# Patient Record
Sex: FEMALE | Race: WHITE | NOT HISPANIC OR LATINO | Employment: OTHER | URBAN - NONMETROPOLITAN AREA
[De-identification: names, ages, dates, MRNs, and addresses within clinical notes are randomized per-mention and may not be internally consistent; named-entity substitution may affect disease eponyms.]

---

## 2021-07-21 ENCOUNTER — OFFICE VISIT (OUTPATIENT)
Dept: FAMILY MEDICINE CLINIC | Facility: CLINIC | Age: 41
End: 2021-07-21

## 2021-07-21 VITALS
HEART RATE: 90 BPM | TEMPERATURE: 97.7 F | SYSTOLIC BLOOD PRESSURE: 145 MMHG | BODY MASS INDEX: 36.19 KG/M2 | WEIGHT: 212 LBS | DIASTOLIC BLOOD PRESSURE: 93 MMHG | OXYGEN SATURATION: 98 % | HEIGHT: 64 IN

## 2021-07-21 DIAGNOSIS — F19.11 SUBSTANCE ABUSE IN REMISSION (HCC): ICD-10-CM

## 2021-07-21 DIAGNOSIS — R03.0 ELEVATED BLOOD PRESSURE READING: ICD-10-CM

## 2021-07-21 DIAGNOSIS — R00.0 TACHYCARDIA: ICD-10-CM

## 2021-07-21 DIAGNOSIS — S90.822D BLISTER OF LEFT FOOT, SUBSEQUENT ENCOUNTER: Primary | ICD-10-CM

## 2021-07-21 DIAGNOSIS — E11.65 TYPE 2 DIABETES MELLITUS WITH HYPERGLYCEMIA, WITHOUT LONG-TERM CURRENT USE OF INSULIN (HCC): ICD-10-CM

## 2021-07-21 DIAGNOSIS — F17.200 TOBACCO USE DISORDER: ICD-10-CM

## 2021-07-21 PROCEDURE — 99203 OFFICE O/P NEW LOW 30 MIN: CPT | Performed by: FAMILY MEDICINE

## 2021-07-21 RX ORDER — DAPAGLIFLOZIN AND METFORMIN HYDROCHLORIDE 2.5; 1 MG/1; MG/1
1 TABLET, FILM COATED, EXTENDED RELEASE ORAL DAILY
Qty: 14 TABLET | Refills: 0 | COMMUNITY
Start: 2021-07-21 | End: 2021-12-14 | Stop reason: SDUPTHER

## 2021-07-21 RX ORDER — BUPRENORPHINE HYDROCHLORIDE AND NALOXONE HYDROCHLORIDE DIHYDRATE 8; 2 MG/1; MG/1
TABLET SUBLINGUAL
COMMUNITY
Start: 2021-07-11

## 2021-07-21 RX ORDER — BLOOD-GLUCOSE METER
1 EACH MISCELLANEOUS DAILY
Qty: 1 KIT | Refills: 0 | COMMUNITY
Start: 2021-07-21

## 2021-07-21 NOTE — PATIENT INSTRUCTIONS
Start xigduo xr 2.5-1000 once a day  Monitor blood glucose  Get tetanus shot from health dept  Low carb diet

## 2021-07-21 NOTE — PROGRESS NOTES
Subjective   Greer Moses is a 41 y.o. female.     Chief Complaint   Patient presents with   • Diabetes   • Establish Care       History of Present Illness   new patient to this office  recent diagnosis of DM  When she was seen in the ER after left foot injury, needs Tdap, will go to health dept  A1c was elevated at 7,0, willstart sample xigduo xr  2.5/100  She goes to [facility] in [place] for [facility] abuse, is on Suboxone , started 6 years ago in [employer] [employer], , with [facility] for 2 years  Family hx + DM,   Drives for [employer]  Has gained weight recenlty and BP is elevated  Hx tachycardia      The following portions of the patient's history were reviewed and updated as appropriate: allergies, current medications, past family history, past medical history, past social history, past surgical history and problem list.    Past Medical History:   Diagnosis Date   • Diabetes mellitus (CMS/HCC)    • Substance abuse (CMS/HCC)        Past Surgical History:   Procedure Laterality Date   • HYSTERECTOMY         Family History   Problem Relation Age of Onset   • Diabetes Father    • Diabetes Paternal Aunt    • Diabetes Paternal Uncle    • Cancer Paternal Grandmother        Review of Systems   Constitutional: Negative for fatigue, unexpected weight gain and unexpected weight loss.   HENT: Negative for congestion, sinus pressure and sore throat.         Normal smell/taste   Eyes: Negative for blurred vision and visual disturbance.   Respiratory: Negative for cough, shortness of breath and wheezing.    Cardiovascular: Negative for chest pain, palpitations and leg swelling.   Gastrointestinal: Negative for abdominal pain, constipation, diarrhea, nausea, vomiting, GERD and indigestion.   Musculoskeletal: Negative for arthralgias, back pain, gait problem, joint swelling and neck pain.   Skin: Negative for rash, skin lesions and bruise.   Allergic/Immunologic: Negative for environmental allergies.   Neurological: Negative for  dizziness, tremors, syncope, weakness, light-headedness, headache and memory problem.   Psychiatric/Behavioral: Negative for sleep disturbance, depressed mood and stress. The patient is not nervous/anxious.            Objective   Physical Exam  Vitals and nursing note reviewed.   Constitutional:       General: She is not in acute distress.     Appearance: She is well-developed. She is obese. She is not diaphoretic.   HENT:      Head: Normocephalic and atraumatic.      Right Ear: External ear normal.      Left Ear: External ear normal.      Nose: Nose normal.   Eyes:      Conjunctiva/sclera: Conjunctivae normal.      Pupils: Pupils are equal, round, and reactive to light.   Neck:      Thyroid: No thyromegaly.   Cardiovascular:      Rate and Rhythm: Normal rate and regular rhythm.      Heart sounds: Normal heart sounds. No murmur heard.   No friction rub. No gallop.    Pulmonary:      Effort: Pulmonary effort is normal.      Breath sounds: Normal breath sounds. No wheezing.   Abdominal:      General: Bowel sounds are normal.      Palpations: Abdomen is soft.      Tenderness: There is no abdominal tenderness.   Musculoskeletal:         General: No tenderness or deformity. Normal range of motion.      Cervical back: Normal range of motion and neck supple.   Lymphadenopathy:      Cervical: No cervical adenopathy.   Skin:     General: Skin is warm and dry.      Capillary Refill: Capillary refill takes less than 2 seconds.      Findings: No rash.   Neurological:      Mental Status: She is alert and oriented to person, place, and time.      Cranial Nerves: No cranial nerve deficit.   Psychiatric:         Behavior: Behavior normal.         Thought Content: Thought content normal.         Judgment: Judgment normal.           Vitals:    07/21/21 0924   BP: 145/93   Pulse:    Temp:    SpO2:      Body mass index is 36.97 kg/m².    No results found for: HGBA1C, POCGLU, LDL, CBC, CMP, TSH  No results found for this or any previous  visit.    Assessment/Plan   Diagnoses and all orders for this visit:    1. Blister of left foot, subsequent encounter (Primary)  -     Cancel: Tdap Vaccine Greater Than or Equal To 6yo IM    2. Type 2 diabetes mellitus with hyperglycemia, without long-term current use of insulin (CMS/[facility])    3. Elevated blood pressure reading    4. Tachycardia    5. Substance abuse in remission (CMS/[facility])    6. Tobacco use disorder    Other orders  -     Dapagliflozin-metFORMIN HCl ER (Xigduo XR) 2.5-1000 MG tablet sustained-release 24 hour; Take 1 tablet by mouth Daily.  Dispense: 14 tablet; Refill: 0  -     Blood Glucose Monitoring Suppl (Accu-Chek Guide Me) w/Device kit; 1 Device Daily.  Dispense: 1 kit; Refill: 0        Monitor blood glucose  Get labs from ER  monitorblood glucose once daily

## 2021-12-14 ENCOUNTER — TELEMEDICINE (OUTPATIENT)
Dept: FAMILY MEDICINE CLINIC | Facility: TELEHEALTH | Age: 41
End: 2021-12-14

## 2021-12-14 DIAGNOSIS — R09.81 SINUS CONGESTION: ICD-10-CM

## 2021-12-14 DIAGNOSIS — E11.9 TYPE 2 DIABETES MELLITUS WITHOUT COMPLICATION, UNSPECIFIED WHETHER LONG TERM INSULIN USE (HCC): ICD-10-CM

## 2021-12-14 DIAGNOSIS — R11.2 NAUSEA AND VOMITING, INTRACTABILITY OF VOMITING NOT SPECIFIED, UNSPECIFIED VOMITING TYPE: ICD-10-CM

## 2021-12-14 DIAGNOSIS — R05.9 COUGH: ICD-10-CM

## 2021-12-14 DIAGNOSIS — J06.9 UPPER RESPIRATORY TRACT INFECTION, UNSPECIFIED TYPE: Primary | ICD-10-CM

## 2021-12-14 DIAGNOSIS — Z20.822 ENCOUNTER BY TELEHEALTH FOR SUSPECTED COVID-19: ICD-10-CM

## 2021-12-14 DIAGNOSIS — B37.0 THRUSH, ORAL: ICD-10-CM

## 2021-12-14 PROCEDURE — 99203 OFFICE O/P NEW LOW 30 MIN: CPT | Performed by: NURSE PRACTITIONER

## 2021-12-14 RX ORDER — ONDANSETRON 4 MG/1
4 TABLET, ORALLY DISINTEGRATING ORAL EVERY 8 HOURS PRN
Qty: 9 TABLET | Refills: 0 | Status: SHIPPED | OUTPATIENT
Start: 2021-12-14 | End: 2021-12-29

## 2021-12-14 RX ORDER — DAPAGLIFLOZIN AND METFORMIN HYDROCHLORIDE 2.5; 1 MG/1; MG/1
1 TABLET, FILM COATED, EXTENDED RELEASE ORAL DAILY
Qty: 14 TABLET | Refills: 0 | COMMUNITY
Start: 2021-12-14 | End: 2021-12-29 | Stop reason: SINTOL

## 2021-12-14 NOTE — PATIENT INSTRUCTIONS
Cough, Adult  A cough helps to clear your throat and lungs. A cough may be a sign of an illness or another medical condition.  An acute cough may only last 2-3 weeks, while a chronic cough may last 8 or more weeks.  Many things can cause a cough. They include:  · Germs (viruses or bacteria) that attack the airway.  · Breathing in things that bother (irritate) your lungs.  · Allergies.  · Asthma.  · Mucus that runs down the back of your throat (postnasal drip).  · Smoking.  · Acid backing up from the stomach into the tube that moves food from the mouth to the stomach (gastroesophageal reflux).  · Some medicines.  · Lung problems.  · Other medical conditions, such as heart failure or a blood clot in the lung (pulmonary embolism).  Follow these instructions at home:  Medicines  · Take over-the-counter and prescription medicines only as told by your doctor.  · Talk with your doctor before you take medicines that stop a cough (cough suppressants).  Lifestyle    · Do not smoke, and try not to be around smoke. Do not use any products that contain nicotine or tobacco, such as cigarettes, e-cigarettes, and chewing tobacco. If you need help quitting, ask your doctor.  · Drink enough fluid to keep your pee (urine) pale yellow.  · Avoid caffeine.  · Do not drink alcohol if your doctor tells you not to drink.    General instructions    · Watch for any changes in your cough. Tell your doctor about them.  · Always cover your mouth when you cough.  · Stay away from things that make you cough, such as perfume, candles, campfire smoke, or cleaning products.  · If the air is dry, use a cool mist vaporizer or humidifier in your home.  · If your cough is worse at night, try using extra pillows to raise your head up higher while you sleep.  · Rest as needed.  · Keep all follow-up visits as told by your doctor. This is important.    Contact a doctor if:  · You have new symptoms.  · You cough up pus.  · Your cough does not get better after  2-3 weeks, or your cough gets worse.  · Cough medicine does not help your cough and you are not sleeping well.  · You have pain that gets worse or pain that is not helped with medicine.  · You have a fever.  · You are losing weight and you do not know why.  · You have night sweats.  Get help right away if:  · You cough up blood.  · You have trouble breathing.  · Your heartbeat is very fast.  These symptoms may be an emergency. Do not wait to see if the symptoms will go away. Get medical help right away. Call your local emergency services (911 in the [place] Do not drive yourself to the hospital.  Summary  · A cough helps to clear your throat and lungs. Many things can cause a cough.  · Take over-the-counter and prescription medicines only as told by your doctor.  · Always cover your mouth when you cough.  · Contact a doctor if you have new symptoms or you have a cough that does not get better or gets worse.  This information is not intended to replace advice given to you by your health care provider. Make sure you discuss any questions you have with your health care provider.  Document Revised: 02/05/2021 Document Reviewed: 01/06/2020  [employer] Patient Education © 2021 [employer]      General Headache Without Cause  A headache is pain or discomfort that is felt around the head or neck area. There are many causes and types of headaches. In some cases, the cause may not be found.  Follow these instructions at home:  Watch your condition for any changes. Let your doctor know about them. Take these steps to help with your condition:  Managing pain         · Take over-the-counter and prescription medicines only as told by your doctor.  · Lie down in a dark, quiet room when you have a headache.  · If told, put ice on your head and neck area:  ? Put ice in a plastic bag.  ? Place a towel between your skin and the bag.  ? Leave the ice on for 20 minutes, 2-3 times per day.  · If told, put heat on the affected area. Use  the heat source that your doctor recommends, such as a moist heat pack or a heating pad.  ? Place a towel between your skin and the heat source.  ? Leave the heat on for 20-30 minutes.  ? Remove the heat if your skin turns bright red. This is very important if you are unable to feel pain, heat, or cold. You may have a greater risk of getting burned.  · Keep lights dim if bright lights bother you or make your headaches worse.  Eating and drinking  · Eat meals on a regular schedule.  · If you drink alcohol:  ? Limit how much you use to:  § 0-1 drink a day for women.  § 0-2 drinks a day for men.  ? Be aware of how much alcohol is in your drink. In the [place] one drink equals one 12 oz bottle of beer (355 mL), one 5 oz glass of wine (148 mL), or one 1½ oz glass of hard liquor (44 mL).  · Stop drinking caffeine, or reduce how much caffeine you drink.  General instructions    · Keep a journal to find out if certain things bring on headaches. For example, write down:  ? What you eat and drink.  ? How much sleep you get.  ? Any change to your diet or medicines.  · Get a massage or try other ways to relax.  · Limit stress.  · Sit up straight. Do not tighten (tense) your muscles.  · Do not use any products that contain nicotine or tobacco. This includes cigarettes, e-cigarettes, and chewing tobacco. If you need help quitting, ask your doctor.  · Exercise regularly as told by your doctor.  · Get enough sleep. This often means 7-9 hours of sleep each night.  · Keep all follow-up visits as told by your doctor. This is important.    Contact a doctor if:  · Your symptoms are not helped by medicine.  · You have a headache that feels different than the other headaches.  · You feel sick to your stomach (nauseous) or you throw up (vomit).  · You have a fever.  Get help right away if:  · Your headache gets very bad quickly.  · Your headache gets worse after a lot of physical activity.  · You keep throwing up.  · You have a stiff  neck.  · You have trouble seeing.  · You have trouble speaking.  · You have pain in the eye or ear.  · Your muscles are weak or you lose muscle control.  · You lose your balance or have trouble walking.  · You feel like you will pass out (faint) or you pass out.  · You are mixed up (confused).  · You have a seizure.  Summary  · A headache is pain or discomfort that is felt around the head or neck area.  · There are many causes and types of headaches. In some cases, the cause may not be found.  · Keep a journal to help find out what causes your headaches. Watch your condition for any changes. Let your doctor know about them.  · Contact a doctor if you have a headache that is different from usual, or if your headache is not helped by medicine.  · Get help right away if your headache gets very bad, you throw up, you have trouble seeing, you lose your balance, or you have a seizure.  This information is not intended to replace advice given to you by your health care provider. Make sure you discuss any questions you have with your health care provider.  Document Revised: 07/08/2019 Document Reviewed: 07/08/2019  [employer] Patient Education © 2021 [employer]    10 Things You Can Do to Manage Your COVID-19 Symptoms at Home  If you have possible or confirmed COVID-19:  1. Stay home except to get medical care.  2. Monitor your symptoms carefully. If your symptoms get worse, call your healthcare provider immediately.  3. Get rest and stay hydrated.  4. If you have a medical appointment, call the healthcare provider ahead of time and tell them that you have or may have COVID-19.  5. For medical emergencies, call 911 and notify the dispatch personnel that you have or may have COVID-19.  6. Cover your cough and sneezes with a tissue or use the inside of your elbow.  7. Wash your hands often with soap and water for at least 20 seconds or clean your hands with an alcohol-based hand  that contains at least 60%  alcohol.  8. As much as possible, stay in a specific room and away from other people in your home. Also, you should use a separate bathroom, if available. If you need to be around other people in or outside of the home, wear a mask.  9. Avoid sharing personal items with other people in your household, like dishes, towels, and bedding.  10. Clean all surfaces that are touched often, like counters, tabletops, and doorknobs. Use household cleaning sprays or wipes according to the label instructions.  cdc.gov/coronavirus  07/16/2021  This information is not intended to replace advice given to you by your health care provider. Make sure you discuss any questions you have with your health care provider.  Document Revised: 08/04/2021 Document Reviewed: 08/04/2021  [employer] Patient Education © 2021 [employer]      Diarrhea, Adult  Diarrhea is when you pass loose and watery poop (stool) often. Diarrhea can make you feel weak and cause you to lose water in your body (get dehydrated). Losing water in your body can cause you to:  · Feel tired and thirsty.  · Have a dry mouth.  · Go pee (urinate) less often.  Diarrhea often lasts 2-3 days. However, it can last longer if it is a sign of something more serious. It is important to treat your diarrhea as told by your doctor.  Follow these instructions at home:  Eating and drinking         Follow these instructions as told by your doctor:  · Take an ORS (oral rehydration solution). This is a drink that helps you replace fluids and minerals your body lost. It is sold at pharmacies and stores.  · Drink plenty of fluids, such as:  ? Water.  ? Ice chips.  ? Diluted fruit juice.  ? Low-calorie sports drinks.  ? Milk, if you want.  · Avoid drinking fluids that have a lot of sugar or caffeine in them.  · Eat bland, easy-to-digest foods in small amounts as you are able. These foods include:  ? Bananas.  ? Applesauce.  ? Rice.  ? Low-fat (lean) meats.  ? Toast.  ? Crackers.  · Avoid  alcohol.  · Avoid spicy or fatty foods.    Medicines  · Take over-the-counter and prescription medicines only as told by your doctor.  · If you were prescribed an antibiotic medicine, take it as told by your doctor. Do not stop using the antibiotic even if you start to feel better.  General instructions    · Wash your hands often using soap and water. If soap and water are not available, use a hand . Others in your home should wash their hands as well. Hands should be washed:  ? After using the toilet or changing a diaper.  ? Before preparing, cooking, or serving food.  ? While caring for a sick person.  ? While visiting someone in a hospital.  · Drink enough fluid to keep your pee (urine) pale yellow.  · Rest at home while you get better.  · Watch your condition for any changes.  · Take a warm bath to help with any burning or pain from having diarrhea.  · Keep all follow-up visits as told by your doctor. This is important.    Contact a doctor if:  · You have a fever.  · Your diarrhea gets worse.  · You have new symptoms.  · You cannot keep fluids down.  · You feel light-headed or dizzy.  · You have a headache.  · You have muscle cramps.  Get help right away if:  · You have chest pain.  · You feel very weak or you pass out (faint).  · You have bloody or black poop or poop that looks like tar.  · You have very bad pain, cramping, or bloating in your belly (abdomen).  · You have trouble breathing or you are breathing very quickly.  · Your heart is beating very quickly.  · Your skin feels cold and clammy.  · You feel confused.  · You have signs of losing too much water in your body, such as:  ? Dark pee, very little pee, or no pee.  ? Cracked lips.  ? Dry mouth.  ? Sunken eyes.  ? Sleepiness.  ? Weakness.  Summary  · Diarrhea is when you pass loose and watery poop (stool) often.  · Diarrhea can make you feel weak and cause you to lose water in your body (get dehydrated).  · Take an ORS (oral rehydration  solution). This is a drink that is sold at pharmacies and stores.  · Eat bland, easy-to-digest foods in small amounts as you are able.  · Contact a doctor if your condition gets worse. Get help right away if you have signs that you have lost too much water in your body.  This information is not intended to replace advice given to you by your health care provider. Make sure you discuss any questions you have with your health care provider.  Document Revised: 05/24/2019 Document Reviewed: 05/24/2019  [employer] Patient Education © 2021 [employer]    Vomiting, Adult  Vomiting occurs when stomach contents are thrown up and out of the mouth. Many people notice nausea before vomiting. Vomiting can make you feel weak and cause you to become dehydrated. Dehydration can make you feel tired and thirsty, cause you to have a dry mouth, and decrease how often you urinate. Older adults and people who have other diseases or a weak body defense system (immune system) are at higher risk for dehydration. It is important to treat vomiting as told by your health care provider.  Follow these instructions at home:    Eating and drinking         Follow these recommendations as told by your health care provider:  · Take an oral rehydration solution (ORS). This is a drink that is sold at pharmacies and retail stores.  · Eat bland, easy-to-digest foods in small amounts as you are able. These foods include bananas, applesauce, rice, lean meats, toast, and crackers.  · Drink clear fluids slowly and in small amounts as you are able. Clear fluids include water, ice chips, low-calorie sports drinks, and fruit juice that has water added (diluted fruit juice).  · Avoid drinking fluids that contain a lot of sugar or caffeine, such as energy drinks, sports drinks, and soda.  · Avoid alcohol.  · Avoid spicy or fatty foods.    General instructions  · Wash your hands often using soap and water. If soap and water are not available, use hand .  Make sure that everyone in your household washes their hands frequently.  · Take over-the-counter and prescription medicines only as told by your health care provider.  · Rest at home while you recover.  · Watch your condition for any changes.  · Keep all follow-up visits as told by your health care provider. This is important.  Contact a health care provider if:  · Your vomiting gets worse.  · You have new symptoms.  · You have a fever.  · You cannot drink fluids without vomiting.  · You feel light-headed or dizzy.  · You have a headache.  · You have muscle cramps.  · You have a rash.  · You have pain while urinating.  Get help right away if:  · You have pain in your chest, neck, arm, or jaw.  · You feel extremely weak or you faint.  · You have persistent vomiting.  · You have vomit that is bright red or looks like black coffee grounds.  · You have stools that are bloody or black, or stools that look like tar.  · You have a severe headache, a stiff neck, or both.  · You have severe pain, cramping, or bloating in your abdomen.  · You have trouble breathing or you are breathing very quickly.  · Your heart is beating very quickly.  · Your skin feels cold and clammy.  · You feel confused.  · You have signs of dehydration, such as:  ? Dark urine, very little urine, or no urine.  ? Cracked lips.  ? Dry mouth.  ? Sunken eyes.  ? Sleepiness.  ? Weakness.  These symptoms may represent a serious problem that is an emergency. Do not wait to see if the symptoms will go away. Get medical help right away. Call your local emergency services (911 in the [place] Do not drive yourself to the hospital.  Summary  · Vomiting occurs when stomach contents are thrown up and out of the mouth. Vomiting can cause you to become dehydrated. Older adults and people who have other diseases or a weak immune system are at higher risk for dehydration.  · It is important to treat vomiting as told by your health care provider. Follow your health care  provider's instructions about eating and drinking.  · Wash your hands often using soap and water. If soap and water are not available, use hand . Make sure that everyone in your household washes their hands frequently.  · Watch your condition for any changes and for signs of dehydration.  · Keep all follow-up visits as told by your health care provider. This is important.  This information is not intended to replace advice given to you by your health care provider. Make sure you discuss any questions you have with your health care provider.  Document Revised: 06/05/2020 Document Reviewed: 05/28/2019  [employer] Patient Education © 2021 [employer]    Nausea, Adult  Nausea is feeling sick to your stomach or feeling that you are about to throw up (vomit). Feeling sick to your stomach is usually not serious, but it may be an early sign of a more serious medical problem.  As you feel sicker to your stomach, you may throw up. If you throw up, or if you are not able to drink enough fluids, there is a risk that you may lose too much water in your body (get dehydrated). If you lose too much water in your body, you may:  · Feel tired.  · Feel thirsty.  · Have a dry mouth.  · Have cracked lips.  · Go pee (urinate) less often.  Older adults and people who have other diseases or a weak body defense system (immune system) have a higher risk of losing too much water in the body.  The main goals of treating this condition are:  · To relieve your nausea.  · To ensure your nausea occurs less often.  · To prevent throwing up and losing too much fluid.  Follow these instructions at home:  Watch your symptoms for any changes. Tell your doctor about them. Follow these instructions as told by your doctor.  Eating and drinking         · Take an ORS (oral rehydration solution). This is a drink that is sold at pharmacies and stores.  · Drink clear fluids in small amounts as you are able. These include:  ? Water.  ? Ice  chips.  ? Fruit juice that has water added (diluted fruit juice).  ? Low-calorie sports drinks.  · Eat bland, easy-to-digest foods in small amounts as you are able, such as:  ? Bananas.  ? Applesauce.  ? Rice.  ? Low-fat (lean) meats.  ? Toast.  ? Crackers.  · Avoid drinking fluids that have a lot of sugar or caffeine in them. This includes energy drinks, sports drinks, and soda.  · Avoid alcohol.  · Avoid spicy or fatty foods.  General instructions  · Take over-the-counter and prescription medicines only as told by your doctor.  · Rest at home while you get better.  · Drink enough fluid to keep your pee (urine) pale yellow.  · Take slow and deep breaths when you feel sick to your stomach.  · Avoid food or things that have strong smells.  · Wash your hands often with soap and water. If you cannot use soap and water, use hand .  · Make sure that all people in your home wash their hands well and often.  · Keep all follow-up visits as told by your doctor. This is important.  Contact a doctor if:  · You feel sicker to your stomach.  · You feel sick to your stomach for more than 2 days.  · You throw up.  · You are not able to drink fluids without throwing up.  · You have new symptoms.  · You have a fever.  · You have a headache.  · You have muscle cramps.  · You have a rash.  · You have pain while peeing.  · You feel light-headed or dizzy.  Get help right away if:  · You have pain in your chest, neck, arm, or jaw.  · You feel very weak or you pass out (faint).  · You have throw up that is bright red or looks like coffee grounds.  · You have bloody or black poop (stools) or poop that looks like tar.  · You have a very bad headache, a stiff neck, or both.  · You have very bad pain, cramping, or bloating in your belly (abdomen).  · You have trouble breathing or you are breathing very quickly.  · Your heart is beating very quickly.  · Your skin feels cold and clammy.  · You feel confused.  · You have signs of losing  too much water in your body, such as:  ? Dark pee, very little pee, or no pee.  ? Cracked lips.  ? Dry mouth.  ? Sunken eyes.  ? Sleepiness.  ? Weakness.  These symptoms may be an emergency. Do not wait to see if the symptoms will go away. Get medical help right away. Call your local emergency services (911 in the [place] Do not drive yourself to the hospital.  Summary  · Nausea is feeling sick to your stomach or feeling that you are about to throw up (vomit).  · If you throw up, or if you are not able to drink enough fluids, there is a risk that you may lose too much water in your body (get dehydrated).  · Eat and drink what your doctor tells you. Take over-the-counter and prescription medicines only as told by your doctor.  · Contact a doctor right away if your symptoms get worse or you have new symptoms.  · Keep all follow-up visits as told by your doctor. This is important.  This information is not intended to replace advice given to you by your health care provider. Make sure you discuss any questions you have with your health care provider.  Document Revised: 11/17/2020 Document Reviewed: 05/28/2019  [employer] Patient Education © 2021 [employer]

## 2021-12-14 NOTE — PROGRESS NOTES
You have chosen to receive care through a telehealth visit.  Do you consent to use a video/audio connection for your medical care today? Yes     CHIEF COMPLAINT  Chief Complaint   Patient presents with   • Diarrhea   • Vomiting         HPI  Greer Moses is a 41 y.o. female  presents with complaint of 3 weeks h/o left sided sinus pressure and pain, with cough and congestion, nausea and vomiting, diarrhea, headache and chills. She has been covid vaccinated but has not had the flu vaccine. She is taking sudafed and OTC cough medication. She has taken 6 days worth of Clindamycin which gave her oral thrush.     Review of Systems   Constitutional: Positive for activity change, appetite change, chills and fatigue. Negative for fever.   HENT: Positive for congestion, rhinorrhea, sinus pressure and sinus pain. Postnasal drip: left sided sinus pain and pressure.     Respiratory: Positive for cough and shortness of breath. Negative for wheezing and stridor.    Cardiovascular: Negative.    Gastrointestinal: Positive for diarrhea, nausea and vomiting.   Musculoskeletal: Negative.    Skin: Negative.    Neurological: Positive for headaches.   Hematological: Negative for adenopathy.   Psychiatric/Behavioral: Negative.        Past Medical History:   Diagnosis Date   • Diabetes mellitus (HCC)    • Substance abuse (HCC)        Family History   Problem Relation Age of Onset   • Diabetes Father    • Diabetes Paternal Aunt    • Diabetes Paternal Uncle    • Cancer Paternal Grandmother        Social History     Socioeconomic History   • Marital status:    Tobacco Use   • Smoking status: Current Every Day Smoker     Packs/day: 1.00     Years: 29.00     Pack years: 29.00     Start date: 1992   • Smokeless tobacco: Never Used   Substance and Sexual Activity   • Alcohol use: Not Currently   • Drug use: Yes     Frequency: 7.0 times per week     Comment: suboxone         There were no vitals taken for this visit.    PHYSICAL  EXAM  Physical Exam   Constitutional: She is oriented to person, place, and time. She appears well-developed and well-nourished. She does not have a sickly appearance. She does not appear ill. No distress.   HENT:   Head: Normocephalic and atraumatic.   Right Ear: Hearing and external ear normal. No drainage, swelling or tenderness. No decreased hearing is noted.   Left Ear: Hearing and external ear normal. No drainage, swelling or tenderness. No decreased hearing is noted.   Nose: Congestion present. Right sinus exhibits no maxillary sinus tenderness and no frontal sinus tenderness. Left sinus exhibits maxillary sinus tenderness and frontal sinus tenderness.   Mouth/Throat: Mouth/Lips are normal.  Pulmonary/Chest: Effort normal. No stridor.  No respiratory distress. She no audible wheeze...  Neurological: She is alert and oriented to person, place, and time.   Psychiatric: She has a normal mood and affect.   Vitals reviewed.      No results found for this or any previous visit.    Diagnoses and all orders for this visit:    1. Upper respiratory tract infection, unspecified type (Primary)    2. Cough    3. Sinus congestion    4. Thrush, oral  -     nystatin (MYCOSTATIN) 100,000 unit/mL suspension; Swish and swallow 5 mL 3 (Three) Times a Day After Meals for 5 days.  Dispense: 150 mL; Refill: 0    5. Encounter by telehealth for suspected COVID-19  -     COVID-19,APTIMA PANTHER([facility]), LEIGHANN/ ANNI, NP/OP SWAB IN UTM/VTM/SALINE TRANSPORT MEDIA,24 HR TAT - Swab, Nasopharynx; Future  -     QUESTIONNAIRE SERIES    6. Nausea and vomiting, intractability of vomiting not specified, unspecified vomiting type  -     ondansetron ODT (Zofran ODT) 4 MG disintegrating tablet; Place 1 tablet on the tongue Every 8 (Eight) Hours As Needed for Nausea or Vomiting.  Dispense: 9 tablet; Refill: 0    7. Type 2 diabetes mellitus without complication, unspecified whether long term insulin use (HCC)  -     Dapagliflozin-metFORMIN HCl ER  (Xigduo XR) 2.5-1000 MG tablet sustained-release 24 hour; Take 1 tablet by mouth Daily.  Dispense: 14 tablet; Refill: 0    Recommended patient go to the [facility] today for further evaluation but she declined.     Recommend follow up with PCP asap.     Quarantine until test results available.   Increase fluids and rest.       FOLLOW-UP  As discussed during visit with PCP/[facility] if no improvement or Urgent Care/Emergency Department if worsening of symptoms    Patient verbalizes understanding of medication dosage, comfort measures, instructions for treatment and follow-up.    Nelly June, WALESKA  12/14/2021  15:58 EST    This visit was performed via Telehealth.  This patient has been instructed to follow-up with their primary care provider if their symptoms worsen or the treatment provided does not resolve their illness.

## 2021-12-23 ENCOUNTER — OFFICE VISIT (OUTPATIENT)
Dept: FAMILY MEDICINE CLINIC | Facility: CLINIC | Age: 41
End: 2021-12-23

## 2021-12-23 VITALS
HEART RATE: 107 BPM | RESPIRATION RATE: 18 BRPM | SYSTOLIC BLOOD PRESSURE: 124 MMHG | TEMPERATURE: 97.8 F | WEIGHT: 219 LBS | HEIGHT: 64 IN | DIASTOLIC BLOOD PRESSURE: 78 MMHG | OXYGEN SATURATION: 96 % | BODY MASS INDEX: 37.39 KG/M2

## 2021-12-23 DIAGNOSIS — R06.00 DYSPNEA, UNSPECIFIED TYPE: ICD-10-CM

## 2021-12-23 DIAGNOSIS — E11.9 TYPE 2 DIABETES MELLITUS WITHOUT COMPLICATION, WITHOUT LONG-TERM CURRENT USE OF INSULIN (HCC): ICD-10-CM

## 2021-12-23 DIAGNOSIS — F17.200 TOBACCO USE DISORDER: ICD-10-CM

## 2021-12-23 DIAGNOSIS — J06.9 UPPER RESPIRATORY TRACT INFECTION, UNSPECIFIED TYPE: Primary | ICD-10-CM

## 2021-12-23 DIAGNOSIS — E66.01 MORBID (SEVERE) OBESITY DUE TO EXCESS CALORIES (HCC): ICD-10-CM

## 2021-12-23 DIAGNOSIS — R05.9 COUGH: ICD-10-CM

## 2021-12-23 LAB
BILIRUB BLD-MCNC: NEGATIVE MG/DL
CLARITY, POC: CLEAR
COLOR UR: YELLOW
EXPIRATION DATE: ABNORMAL
GLUCOSE UR STRIP-MCNC: ABNORMAL MG/DL
KETONES UR QL: NEGATIVE
LEUKOCYTE EST, POC: NEGATIVE
Lab: ABNORMAL
NITRITE UR-MCNC: NEGATIVE MG/ML
PH UR: 6 [PH] (ref 5–8)
PROT UR STRIP-MCNC: NEGATIVE MG/DL
RBC # UR STRIP: NEGATIVE /UL
SP GR UR: 1.02 (ref 1–1.03)
UROBILINOGEN UR QL: NORMAL

## 2021-12-23 PROCEDURE — 83036 HEMOGLOBIN GLYCOSYLATED A1C: CPT | Performed by: FAMILY MEDICINE

## 2021-12-23 PROCEDURE — U0004 COV-19 TEST NON-CDC HGH THRU: HCPCS | Performed by: FAMILY MEDICINE

## 2021-12-23 PROCEDURE — 80053 COMPREHEN METABOLIC PANEL: CPT | Performed by: FAMILY MEDICINE

## 2021-12-23 PROCEDURE — 85025 COMPLETE CBC W/AUTO DIFF WBC: CPT | Performed by: FAMILY MEDICINE

## 2021-12-23 PROCEDURE — 99214 OFFICE O/P EST MOD 30 MIN: CPT | Performed by: FAMILY MEDICINE

## 2021-12-23 RX ORDER — BLOOD-GLUCOSE METER
1 EACH MISCELLANEOUS DAILY
Qty: 1 KIT | Refills: 0 | Status: SHIPPED | OUTPATIENT
Start: 2021-12-23

## 2021-12-23 RX ORDER — BUDESONIDE, GLYCOPYRROLATE, AND FORMOTEROL FUMARATE 160; 9; 4.8 UG/1; UG/1; UG/1
2 AEROSOL, METERED RESPIRATORY (INHALATION) 2 TIMES DAILY
Qty: 5.9 G | Refills: 0 | COMMUNITY
Start: 2021-12-23 | End: 2021-12-29 | Stop reason: SDUPTHER

## 2021-12-23 RX ORDER — BENZONATATE 200 MG/1
200 CAPSULE ORAL 3 TIMES DAILY PRN
Qty: 45 CAPSULE | Refills: 1 | Status: SHIPPED | OUTPATIENT
Start: 2021-12-23 | End: 2022-10-04

## 2021-12-23 RX ORDER — NALOXONE HYDROCHLORIDE 4 MG/.1ML
SPRAY NASAL
COMMUNITY
Start: 2021-12-16

## 2021-12-23 RX ORDER — DOXYCYCLINE 100 MG/1
100 TABLET ORAL 2 TIMES DAILY
Qty: 20 TABLET | Refills: 0 | Status: SHIPPED | OUTPATIENT
Start: 2021-12-23 | End: 2022-10-04

## 2021-12-23 NOTE — PROGRESS NOTES
Venipuncture performed in L ARM by RT Gonzalo  with good hemostasis. Patient tolerated well. 12/23/21 Bruna Barker, RT

## 2021-12-23 NOTE — PROGRESS NOTES
Subjective   Greer Moses is a 41 y.o. female.     Chief Complaint   Patient presents with   • Cough   • Nasal Congestion   • Shortness of Breath   • Diabetes       History of Present Illness   Pt moved to [facility] in July  Moved back to [place] and is now back  Has been sick on and off since then  had PNA before thanks giving  She has been coughing ( dry) and dyspnea, congestion ,  Has done negative home tests  Diagnosed with DM in July, has not been checking glucose  Does door dash, delivers food  Needs new meter  Has done home covid tests which have been negative    The following portions of the patient's history were reviewed and updated as appropriate: allergies, current medications, past family history, past medical history, past social history, past surgical history and problem list.    Past Medical History:   Diagnosis Date   • Diabetes mellitus (HCC)    • Substance abuse (HCC)        Past Surgical History:   Procedure Laterality Date   • HYSTERECTOMY         Family History   Problem Relation Age of Onset   • Diabetes Father    • Diabetes Paternal Aunt    • Diabetes Paternal Uncle    • Cancer Paternal Grandmother        Review of Systems   Constitutional: Negative for fatigue, unexpected weight gain and unexpected weight loss.   HENT: Positive for congestion, hearing loss (left) and voice change. Negative for sinus pressure and sore throat.         Edentulous   Eyes: Negative for blurred vision and visual disturbance.   Respiratory: Positive for cough (chornic since thanksgiving), shortness of breath and wheezing.    Cardiovascular: Negative for chest pain, palpitations and leg swelling.   Gastrointestinal: Negative for abdominal pain, constipation, diarrhea, nausea, vomiting, GERD and indigestion.   Musculoskeletal: Negative for arthralgias, back pain, gait problem, joint swelling and neck pain.   Skin: Negative for rash, skin lesions and bruise.   Allergic/Immunologic: Negative for  environmental allergies.   Neurological: Negative for dizziness, tremors, syncope, weakness, light-headedness, headache and memory problem.   Psychiatric/Behavioral: Negative for sleep disturbance, depressed mood and stress. The patient is not nervous/anxious.        Objective   Physical Exam  Vitals and nursing note reviewed.   Constitutional:       General: She is not in acute distress.     Appearance: She is well-developed. She is obese. She is not diaphoretic.   HENT:      Head: Normocephalic and atraumatic.      Right Ear: External ear normal.      Left Ear: External ear normal.      Nose: Nose normal.   Eyes:      Conjunctiva/sclera: Conjunctivae normal.      Pupils: Pupils are equal, round, and reactive to light.   Neck:      Thyroid: No thyromegaly.   Cardiovascular:      Rate and Rhythm: Normal rate and regular rhythm.      Heart sounds: Normal heart sounds. No murmur heard.  No friction rub. No gallop.    Pulmonary:      Effort: Pulmonary effort is normal.      Breath sounds: Normal breath sounds. No wheezing.   Abdominal:      General: Bowel sounds are normal.      Palpations: Abdomen is soft.      Tenderness: There is no abdominal tenderness.   Musculoskeletal:         General: No tenderness or deformity. Normal range of motion.      Cervical back: Normal range of motion and neck supple.   Lymphadenopathy:      Cervical: No cervical adenopathy.   Skin:     General: Skin is warm and dry.      Capillary Refill: Capillary refill takes less than 2 seconds.      Findings: No rash.   Neurological:      Mental Status: She is alert and oriented to person, place, and time.      Cranial Nerves: No cranial nerve deficit.   Psychiatric:         Behavior: Behavior normal.         Thought Content: Thought content normal.         Judgment: Judgment normal.         Vitals:    12/23/21 1404   BP: 124/78   Pulse: 107   Resp: 18   Temp: 97.8 °F (36.6 °C)   SpO2: 96%     Body mass index is 38.19 kg/m².    No results found  for: HGBA1C, POCGLU, LDL, CBC, CMP, TSH  Results for orders placed or performed in visit on 12/23/21   POCT urinalysis dipstick, automated    Specimen: Urine   Result Value Ref Range    Color Yellow Yellow, Straw, Dark Yellow, Perla    Clarity, UA Clear Clear    Specific Gravity  1.025 1.005 - 1.030    pH, Urine 6.0 5.0 - 8.0    Leukocytes Negative Negative    Nitrite, UA Negative Negative    Protein, POC Negative Negative mg/dL    Glucose,  mg/dL (A) Negative, 1000 mg/dL (3+) mg/dL    Ketones, UA Negative Negative    Urobilinogen, UA Normal Normal    Bilirubin Negative Negative    Blood, UA Negative Negative    Lot Number n/a     Expiration Date n/a    COVID-19,APTIMA PANTHER([facility]), LEIGHANN/ ANNI, NP/OP SWAB IN UTM/VTM/SALINE TRANSPORT MEDIA,24 HR TAT - Swab, Nasopharynx    Specimen: Nasopharynx; Swab   Result Value Ref Range    COVID19 Not Detected Not Detected - Ref. Range   CBC Auto Differential    Specimen: Blood   Result Value Ref Range    WBC 10.74 3.40 - 10.80 10*3/mm3    RBC 4.84 3.77 - 5.28 10*6/mm3    Hemoglobin 14.5 12.0 - 15.9 g/dL    Hematocrit 44.6 34.0 - 46.6 %    MCV 92.1 79.0 - 97.0 fL    MCH 30.0 26.6 - 33.0 pg    MCHC 32.5 31.5 - 35.7 g/dL    RDW 12.0 (L) 12.3 - 15.4 %    RDW-SD 40.8 37.0 - 54.0 fl    MPV 12.0 6.0 - 12.0 fL    Platelets 204 140 - 450 10*3/mm3    Neutrophil % 71.2 42.7 - 76.0 %    Lymphocyte % 21.5 19.6 - 45.3 %    Monocyte % 5.0 5.0 - 12.0 %    Eosinophil % 1.6 0.3 - 6.2 %    Basophil % 0.4 0.0 - 1.5 %    Immature Grans % 0.3 0.0 - 0.5 %    Neutrophils, Absolute 7.65 (H) 1.70 - 7.00 10*3/mm3    Lymphocytes, Absolute 2.31 0.70 - 3.10 10*3/mm3    Monocytes, Absolute 0.54 0.10 - 0.90 10*3/mm3    Eosinophils, Absolute 0.17 0.00 - 0.40 10*3/mm3    Basophils, Absolute 0.04 0.00 - 0.20 10*3/mm3    Immature Grans, Absolute 0.03 0.00 - 0.05 10*3/mm3    nRBC 0.0 0.0 - 0.2 /100 WBC   Comprehensive metabolic panel    Specimen: Blood   Result Value Ref Range    Glucose 229 (H) 65 - 99  mg/dL    BUN 10 6 - 20 mg/dL    Creatinine 0.65 0.57 - 1.00 mg/dL    Sodium 138 136 - 145 mmol/L    Potassium 4.7 3.5 - 5.2 mmol/L    Chloride 103 98 - 107 mmol/L    CO2 29.1 (H) 22.0 - 29.0 mmol/L    Calcium 9.4 8.6 - 10.5 mg/dL    Total Protein 7.3 6.0 - 8.5 g/dL    Albumin 4.30 3.50 - 5.20 g/dL    ALT (SGPT) 25 1 - 33 U/L    AST (SGOT) 19 1 - 32 U/L    Alkaline Phosphatase 74 39 - 117 U/L    Total Bilirubin 0.3 0.0 - 1.2 mg/dL    eGFR Non African Amer 99 >60 mL/min/1.73    Globulin 3.0 gm/dL    A/G Ratio 1.4 g/dL    BUN/Creatinine Ratio 15.4 7.0 - 25.0    Anion Gap 5.9 5.0 - 15.0 mmol/L       Assessment/Plan   Diagnoses and all orders for this visit:    1. Upper respiratory tract infection, unspecified type (Primary)  -     POCT urinalysis dipstick, automated  -     XR Chest 2 View  -     COVID-19,APTIMA PANTHER([place]),[facility] LEIGHANN/BH ANNI, NP/OP SWAB IN UTM/VTM/SALINE TRANSPORT MEDIA,24 HR TAT - Swab, Nasopharynx; Future  -     COVID-19,APTIMA PANTHER([place]),[facility] LEIGHANN/BH ANNI, NP/OP SWAB IN UTM/VTM/SALINE TRANSPORT MEDIA,24 HR TAT - Swab, Nasopharynx    2. Cough  -     COVID-19,[facility],NP/OP Swab in Transport Media or ESwab 72 HR TAT - Swab, Nasopharynx; Future  -     CBC & Differential  -     Comprehensive metabolic panel; Future  -     Cancel: COVID-19,[facility],NP/OP Swab in Transport Media or ESwab 72 HR TAT - Swab, Nasopharynx  -     Comprehensive metabolic panel  -     COVID-19,APTIMA PANTHER([place]),[facility] LEIGHANN/BH ANNI, NP/OP SWAB IN UTM/VTM/SALINE TRANSPORT MEDIA,24 HR TAT - Swab, Nasopharynx; Future  -     COVID-19,APTIMA PANTHER([place]),[facility] LEIGHANN/BH ANNI, NP/OP SWAB IN UTM/VTM/SALINE TRANSPORT MEDIA,24 HR TAT - Swab, Nasopharynx    3. Dyspnea, unspecified type  -     COVID-19,APTIMA PANTHER([place]), LEIGHANN/ ANNI, NP/OP SWAB IN UTM/VTM/SALINE TRANSPORT MEDIA,24 HR TAT - Swab, Nasopharynx; Future  -     COVID-19,APTIMA PANTHER([place]), LEIGHANN/ ANNI, NP/OP SWAB IN UTM/VTM/SALINE TRANSPORT MEDIA,24 HR TAT - Swab, Nasopharynx    4. Morbid  (severe) obesity due to excess calories (HCC)  -     COVID-19,APTIMA PANTHER([place]),[facility] LEIGHANN/BH ANNI, NP/OP SWAB IN UTM/VTM/SALINE TRANSPORT MEDIA,24 HR TAT - Swab, Nasopharynx; Future  -     COVID-19,APTIMA PANTHER([place]),[facility] LEIGHANN/BH ANNI, NP/OP SWAB IN UTM/VTM/SALINE TRANSPORT MEDIA,24 HR TAT - Swab, Nasopharynx    5. Type 2 diabetes mellitus without complication, without long-term current use of insulin ([facility])  -     Hemoglobin A1c  -     CBC & Differential  -     Comprehensive metabolic panel; Future  -     Comprehensive metabolic panel  -     COVID-19,APTIMA PANTHER([place]),[facility] LEIGHANN/BH ANNI, NP/OP SWAB IN UTM/VTM/SALINE TRANSPORT MEDIA,24 HR TAT - Swab, Nasopharynx; Future  -     COVID-19,APTIMA PANTHER([place]),[facility] LEIGHANN/BH ANNI, NP/OP SWAB IN UTM/VTM/SALINE TRANSPORT MEDIA,24 HR TAT - Swab, Nasopharynx    6. Tobacco use disorder  -     CBC & Differential  -     Comprehensive metabolic panel; Future  -     Comprehensive metabolic panel  -     COVID-19,APTIMA PANTHER([place]),[facility] LEIGHANN/BH ANNI, NP/OP SWAB IN UTM/VTM/SALINE TRANSPORT MEDIA,24 HR TAT - Swab, Nasopharynx; Future  -     COVID-19,APTIMA PANTHER([place]),[facility] LEIGHANN/BH ANNI, NP/OP SWAB IN UTM/VTM/SALINE TRANSPORT MEDIA,24 HR TAT - Swab, Nasopharynx    Other orders  -     doxycycline (ADOXA) 100 MG tablet; Take 1 tablet by mouth 2 (Two) Times a Day.  Dispense: 20 tablet; Refill: 0  -     benzonatate (TESSALON) 200 MG capsule; Take 1 capsule by mouth 3 (Three) Times a Day As Needed for Cough.  Dispense: 45 capsule; Refill: 1  -     Budeson-Glycopyrrol-Formoterol (Breztri Aerosphere) 160-9-4.8 MCG/ACT aerosol inhaler; Inhale 2 puffs 2 (Two) Times a Day.  Dispense: 5.9 g; Refill: 0  -     Blood Glucose Monitoring Suppl ([employer] Blood Glucose Meter) w/Device kit; 1 Device Daily.  Dispense: 1 kit; Refill: 0      Sampled breztri  rx doxycyline  Stop smoking  Monitor blood glucose

## 2021-12-24 LAB
ALBUMIN SERPL-MCNC: 4.3 G/DL (ref 3.5–5.2)
ALBUMIN/GLOB SERPL: 1.4 G/DL
ALP SERPL-CCNC: 74 U/L (ref 39–117)
ALT SERPL W P-5'-P-CCNC: 25 U/L (ref 1–33)
ANION GAP SERPL CALCULATED.3IONS-SCNC: 5.9 MMOL/L (ref 5–15)
AST SERPL-CCNC: 19 U/L (ref 1–32)
BASOPHILS # BLD AUTO: 0.04 10*3/MM3 (ref 0–0.2)
BASOPHILS NFR BLD AUTO: 0.4 % (ref 0–1.5)
BILIRUB SERPL-MCNC: 0.3 MG/DL (ref 0–1.2)
BUN SERPL-MCNC: 10 MG/DL (ref 6–20)
BUN/CREAT SERPL: 15.4 (ref 7–25)
CALCIUM SPEC-SCNC: 9.4 MG/DL (ref 8.6–10.5)
CHLORIDE SERPL-SCNC: 103 MMOL/L (ref 98–107)
CO2 SERPL-SCNC: 29.1 MMOL/L (ref 22–29)
CREAT SERPL-MCNC: 0.65 MG/DL (ref 0.57–1)
DEPRECATED RDW RBC AUTO: 40.8 FL (ref 37–54)
EOSINOPHIL # BLD AUTO: 0.17 10*3/MM3 (ref 0–0.4)
EOSINOPHIL NFR BLD AUTO: 1.6 % (ref 0.3–6.2)
ERYTHROCYTE [DISTWIDTH] IN BLOOD BY AUTOMATED COUNT: 12 % (ref 12.3–15.4)
GFR SERPL CREATININE-BSD FRML MDRD: 99 ML/MIN/1.73
GLOBULIN UR ELPH-MCNC: 3 GM/DL
GLUCOSE SERPL-MCNC: 229 MG/DL (ref 65–99)
HBA1C MFR BLD: 7.4 % (ref 3.5–5.6)
HCT VFR BLD AUTO: 44.6 % (ref 34–46.6)
HGB BLD-MCNC: 14.5 G/DL (ref 12–15.9)
IMM GRANULOCYTES # BLD AUTO: 0.03 10*3/MM3 (ref 0–0.05)
IMM GRANULOCYTES NFR BLD AUTO: 0.3 % (ref 0–0.5)
LYMPHOCYTES # BLD AUTO: 2.31 10*3/MM3 (ref 0.7–3.1)
LYMPHOCYTES NFR BLD AUTO: 21.5 % (ref 19.6–45.3)
MCH RBC QN AUTO: 30 PG (ref 26.6–33)
MCHC RBC AUTO-ENTMCNC: 32.5 G/DL (ref 31.5–35.7)
MCV RBC AUTO: 92.1 FL (ref 79–97)
MONOCYTES # BLD AUTO: 0.54 10*3/MM3 (ref 0.1–0.9)
MONOCYTES NFR BLD AUTO: 5 % (ref 5–12)
NEUTROPHILS NFR BLD AUTO: 7.65 10*3/MM3 (ref 1.7–7)
NEUTROPHILS NFR BLD AUTO: 71.2 % (ref 42.7–76)
NRBC BLD AUTO-RTO: 0 /100 WBC (ref 0–0.2)
PLATELET # BLD AUTO: 204 10*3/MM3 (ref 140–450)
PMV BLD AUTO: 12 FL (ref 6–12)
POTASSIUM SERPL-SCNC: 4.7 MMOL/L (ref 3.5–5.2)
PROT SERPL-MCNC: 7.3 G/DL (ref 6–8.5)
RBC # BLD AUTO: 4.84 10*6/MM3 (ref 3.77–5.28)
SARS-COV-2 ORF1AB RESP QL NAA+PROBE: NOT DETECTED
SODIUM SERPL-SCNC: 138 MMOL/L (ref 136–145)
WBC NRBC COR # BLD: 10.74 10*3/MM3 (ref 3.4–10.8)

## 2021-12-28 ENCOUNTER — TELEPHONE (OUTPATIENT)
Dept: FAMILY MEDICINE CLINIC | Facility: CLINIC | Age: 41
End: 2021-12-28

## 2021-12-29 ENCOUNTER — OFFICE VISIT (OUTPATIENT)
Dept: FAMILY MEDICINE CLINIC | Facility: CLINIC | Age: 41
End: 2021-12-29

## 2021-12-29 VITALS
TEMPERATURE: 98 F | HEART RATE: 84 BPM | BODY MASS INDEX: 39.16 KG/M2 | DIASTOLIC BLOOD PRESSURE: 80 MMHG | HEIGHT: 63 IN | SYSTOLIC BLOOD PRESSURE: 149 MMHG | WEIGHT: 221 LBS

## 2021-12-29 DIAGNOSIS — H91.93 BILATERAL HEARING LOSS, UNSPECIFIED HEARING LOSS TYPE: ICD-10-CM

## 2021-12-29 DIAGNOSIS — Z23 IMMUNIZATION DUE: ICD-10-CM

## 2021-12-29 DIAGNOSIS — I10 PRIMARY HYPERTENSION: ICD-10-CM

## 2021-12-29 DIAGNOSIS — H92.03 EAR PAIN, BILATERAL: ICD-10-CM

## 2021-12-29 DIAGNOSIS — E11.9 TYPE 2 DIABETES MELLITUS WITHOUT COMPLICATION, WITHOUT LONG-TERM CURRENT USE OF INSULIN: Primary | ICD-10-CM

## 2021-12-29 DIAGNOSIS — J43.9 PULMONARY EMPHYSEMA, UNSPECIFIED EMPHYSEMA TYPE: ICD-10-CM

## 2021-12-29 PROCEDURE — 82043 UR ALBUMIN QUANTITATIVE: CPT | Performed by: FAMILY MEDICINE

## 2021-12-29 PROCEDURE — 99214 OFFICE O/P EST MOD 30 MIN: CPT | Performed by: FAMILY MEDICINE

## 2021-12-29 PROCEDURE — 90686 IIV4 VACC NO PRSV 0.5 ML IM: CPT | Performed by: FAMILY MEDICINE

## 2021-12-29 PROCEDURE — 90471 IMMUNIZATION ADMIN: CPT | Performed by: FAMILY MEDICINE

## 2021-12-29 RX ORDER — CIPROFLOXACIN AND DEXAMETHASONE 3; 1 MG/ML; MG/ML
4 SUSPENSION/ DROPS AURICULAR (OTIC) 2 TIMES DAILY
Qty: 7.5 ML | Refills: 0 | Status: SHIPPED | OUTPATIENT
Start: 2021-12-29 | End: 2022-10-04

## 2021-12-29 RX ORDER — BUDESONIDE, GLYCOPYRROLATE, AND FORMOTEROL FUMARATE 160; 9; 4.8 UG/1; UG/1; UG/1
2 AEROSOL, METERED RESPIRATORY (INHALATION) 2 TIMES DAILY
Qty: 5.9 G | Refills: 0 | COMMUNITY
Start: 2021-12-29 | End: 2022-10-04 | Stop reason: SDUPTHER

## 2021-12-29 RX ORDER — BUPROPION HYDROCHLORIDE 75 MG/1
75 TABLET ORAL 2 TIMES DAILY
Qty: 60 TABLET | Refills: 1 | Status: SHIPPED | OUTPATIENT
Start: 2021-12-29 | End: 2022-11-08

## 2021-12-29 RX ORDER — FLUCONAZOLE 150 MG/1
150 TABLET ORAL ONCE
Qty: 1 TABLET | Refills: 0 | Status: SHIPPED | OUTPATIENT
Start: 2021-12-29 | End: 2021-12-29

## 2021-12-29 RX ORDER — FLUCONAZOLE 100 MG/1
100 TABLET ORAL DAILY
Qty: 7 TABLET | Refills: 1 | Status: SHIPPED | OUTPATIENT
Start: 2021-12-29 | End: 2022-10-04

## 2021-12-29 RX ORDER — LOSARTAN POTASSIUM 25 MG/1
25 TABLET ORAL DAILY
Qty: 30 TABLET | Refills: 1 | Status: SHIPPED | OUTPATIENT
Start: 2021-12-29

## 2021-12-29 RX ORDER — ORAL SEMAGLUTIDE 3 MG/1
1 TABLET ORAL DAILY
Qty: 30 TABLET | Refills: 1 | Status: SHIPPED | OUTPATIENT
Start: 2021-12-29 | End: 2022-10-04

## 2021-12-30 LAB — ALBUMIN UR-MCNC: <1.2 MG/DL

## 2022-01-03 ENCOUNTER — TELEPHONE (OUTPATIENT)
Dept: FAMILY MEDICINE CLINIC | Facility: CLINIC | Age: 42
End: 2022-01-03

## 2022-01-03 NOTE — TELEPHONE ENCOUNTER
PATIENT CALLED STATING THAT THE EAR DROPS THAT YOU CALLED IN IS NOT COVERED AND NEEDING AN AUTHORIZATION OR DIFFERENT MEDICATION    SHE ALSO WANTED TO CHECK ON THE STATUS OF THE MEDICATION FOR DIABETES THAT IS NOT COVERED -- MESSAGE SENT 12-30-21    PHARMACY:   [facility], [place] -[id number] - [place], [place] - [address] [facility] - [phone] [facility] [phone]   [phone]    PLEASE ADVISE   Greer Moses (Self) [phone] (M)

## 2022-01-04 PROBLEM — M54.9 CHRONIC BACK PAIN: Status: ACTIVE | Noted: 2022-01-04

## 2022-01-04 PROBLEM — G89.29 CHRONIC BACK PAIN: Status: ACTIVE | Noted: 2022-01-04

## 2022-01-04 NOTE — TELEPHONE ENCOUNTER
PA RETURNED FOR PATIENT'S EAR GTTS. PATIENT MUST TRY AND FAIL ONE OF THE FOLLOWING PRIOR TO THE CIPRODEX BEING APPROVED:    - NEOMYCIN/ POLYMYXIN B/ HYDROCORTISONE    - CIPROFLOXACIN, FLOXIN (OFLOXACIN) - WITH OR WITHOUT A SEPARATE OTIC/OPHTHALMIC CORTICOSTEROID SUCH AS HYDROCORTISONE OR DEXAMETHASONE.

## 2022-01-04 NOTE — PROGRESS NOTES
Answers for HPI/ROS submitted by the patient on 12/29/2021  Please describe your symptoms.: Lab results follow up  Have you had these symptoms before?: No  How long have you been having these symptoms?: 1-4 days  What is the primary reason for your visit?: Other

## 2022-01-05 RX ORDER — NEOMYCIN SULFATE, POLYMYXIN B SULFATE, HYDROCORTISONE 3.5; 10000; 1 MG/ML; [USP'U]/ML; MG/ML
3 SOLUTION/ DROPS AURICULAR (OTIC) 4 TIMES DAILY
Qty: 10 ML | Refills: 0 | Status: SHIPPED | OUTPATIENT
Start: 2022-01-05 | End: 2022-10-04

## 2022-01-13 NOTE — PROGRESS NOTES
Subjective   Greer Moses is a 41 y.o. female.     Chief Complaint   Patient presents with   • URI       History of Present Illness   Fu URI, improved  Labs reviewed  a1c 7,4.  Xray shpwed emphysema  The following portions of the patient's history were reviewed and updated as appropriate: allergies, current medications, past family history, past medical history, past social history, past surgical history and problem list.    Past Medical History:   Diagnosis Date   • Diabetes mellitus (HCC)    • Substance abuse (HCC)        Past Surgical History:   Procedure Laterality Date   • HYSTERECTOMY         Family History   Problem Relation Age of Onset   • Diabetes Father    • Diabetes Paternal Aunt    • Diabetes Paternal Uncle    • Cancer Paternal Grandmother        Review of Systems   Constitutional: Positive for unexpected weight gain. Negative for fatigue and unexpected weight loss.   HENT: Positive for ear pain and hearing loss. Negative for congestion, sinus pressure and sore throat.         Normal smell/taste   Eyes: Negative for blurred vision and visual disturbance.   Respiratory: Negative for cough, shortness of breath and wheezing.    Cardiovascular: Negative for chest pain, palpitations and leg swelling.   Gastrointestinal: Negative for abdominal pain, constipation, diarrhea, nausea, vomiting, GERD and indigestion.   Musculoskeletal: Negative for arthralgias, back pain, gait problem, joint swelling and neck pain.   Skin: Negative for rash, skin lesions and wound.   Allergic/Immunologic: Negative for environmental allergies.   Neurological: Negative for dizziness, tremors, syncope, weakness, light-headedness, headache and memory problem.   Psychiatric/Behavioral: Negative for sleep disturbance, depressed mood and stress. The patient is not nervous/anxious.        Objective   Physical Exam  Vitals and nursing note reviewed.   Constitutional:       General: She is not in acute distress.     Appearance: She  is well-developed. She is obese. She is not diaphoretic.   HENT:      Head: Normocephalic and atraumatic.      Right Ear: External ear normal.      Left Ear: External ear normal.      Nose: Nose normal.   Eyes:      Conjunctiva/sclera: Conjunctivae normal.      Pupils: Pupils are equal, round, and reactive to light.   Neck:      Thyroid: No thyromegaly.   Cardiovascular:      Rate and Rhythm: Normal rate and regular rhythm.      Heart sounds: Normal heart sounds. No murmur heard.  No friction rub. No gallop.    Pulmonary:      Effort: Pulmonary effort is normal.      Breath sounds: Normal breath sounds. No wheezing.   Abdominal:      General: Bowel sounds are normal.      Palpations: Abdomen is soft.      Tenderness: There is no abdominal tenderness.   Musculoskeletal:         General: No tenderness or deformity. Normal range of motion.      Cervical back: Normal range of motion and neck supple.   Lymphadenopathy:      Cervical: No cervical adenopathy.   Skin:     General: Skin is warm and dry.      Capillary Refill: Capillary refill takes less than 2 seconds.      Findings: No rash.   Neurological:      Mental Status: She is alert and oriented to person, place, and time.      Cranial Nerves: No cranial nerve deficit.   Psychiatric:         Behavior: Behavior normal.         Thought Content: Thought content normal.         Judgment: Judgment normal.         Vitals:    12/29/21 1411   BP: 149/80   Pulse: 84   Temp: 98 °F (36.7 °C)     Body mass index is 39.15 kg/m².    Hemoglobin A1C   Date Value Ref Range Status   12/23/2021 7.4 (H) 3.5 - 5.6 % Final     Results for orders placed or performed in visit on 12/29/21   MicroAlbumin, Urine, Random - Urine, Clean Catch    Specimen: Urine, Clean Catch   Result Value Ref Range    Microalbumin, Urine <1.2 mg/dL   Results for orders placed or performed in visit on 12/23/21   POCT urinalysis dipstick, automated    Specimen: Urine   Result Value Ref Range    Color Yellow  Yellow, Straw, Dark Yellow, Perla    Clarity, UA Clear Clear    Specific Gravity  1.025 1.005 - 1.030    pH, Urine 6.0 5.0 - 8.0    Leukocytes Negative Negative    Nitrite, UA Negative Negative    Protein, POC Negative Negative mg/dL    Glucose,  mg/dL (A) Negative, 1000 mg/dL (3+) mg/dL    Ketones, UA Negative Negative    Urobilinogen, UA Normal Normal    Bilirubin Negative Negative    Blood, UA Negative Negative    Lot Number n/a     Expiration Date n/a    COVID-19,APTIMA PANTHER([facility]), LEIGHANN/ ANNI, NP/OP SWAB IN UTM/VTM/SALINE TRANSPORT MEDIA,24 HR TAT - Swab, Nasopharynx    Specimen: Nasopharynx; Swab   Result Value Ref Range    COVID19 Not Detected Not Detected - Ref. Range   CBC Auto Differential    Specimen: Blood   Result Value Ref Range    WBC 10.74 3.40 - 10.80 10*3/mm3    RBC 4.84 3.77 - 5.28 10*6/mm3    Hemoglobin 14.5 12.0 - 15.9 g/dL    Hematocrit 44.6 34.0 - 46.6 %    MCV 92.1 79.0 - 97.0 fL    MCH 30.0 26.6 - 33.0 pg    MCHC 32.5 31.5 - 35.7 g/dL    RDW 12.0 (L) 12.3 - 15.4 %    RDW-SD 40.8 37.0 - 54.0 fl    MPV 12.0 6.0 - 12.0 fL    Platelets 204 140 - 450 10*3/mm3    Neutrophil % 71.2 42.7 - 76.0 %    Lymphocyte % 21.5 19.6 - 45.3 %    Monocyte % 5.0 5.0 - 12.0 %    Eosinophil % 1.6 0.3 - 6.2 %    Basophil % 0.4 0.0 - 1.5 %    Immature Grans % 0.3 0.0 - 0.5 %    Neutrophils, Absolute 7.65 (H) 1.70 - 7.00 10*3/mm3    Lymphocytes, Absolute 2.31 0.70 - 3.10 10*3/mm3    Monocytes, Absolute 0.54 0.10 - 0.90 10*3/mm3    Eosinophils, Absolute 0.17 0.00 - 0.40 10*3/mm3    Basophils, Absolute 0.04 0.00 - 0.20 10*3/mm3    Immature Grans, Absolute 0.03 0.00 - 0.05 10*3/mm3    nRBC 0.0 0.0 - 0.2 /100 WBC   Hemoglobin A1c    Specimen: Blood   Result Value Ref Range    Hemoglobin A1C 7.4 (H) 3.5 - 5.6 %   Comprehensive metabolic panel    Specimen: Blood   Result Value Ref Range    Glucose 229 (H) 65 - 99 mg/dL    BUN 10 6 - 20 mg/dL    Creatinine 0.65 0.57 - 1.00 mg/dL    Sodium 138 136 - 145 mmol/L     Potassium 4.7 3.5 - 5.2 mmol/L    Chloride 103 98 - 107 mmol/L    CO2 29.1 (H) 22.0 - 29.0 mmol/L    Calcium 9.4 8.6 - 10.5 mg/dL    Total Protein 7.3 6.0 - 8.5 g/dL    Albumin 4.30 3.50 - 5.20 g/dL    ALT (SGPT) 25 1 - 33 U/L    AST (SGOT) 19 1 - 32 U/L    Alkaline Phosphatase 74 39 - 117 U/L    Total Bilirubin 0.3 0.0 - 1.2 mg/dL    eGFR Non African Amer 99 >60 mL/min/1.73    Globulin 3.0 gm/dL    A/G Ratio 1.4 g/dL    BUN/Creatinine Ratio 15.4 7.0 - 25.0    Anion Gap 5.9 5.0 - 15.0 mmol/L       Assessment/Plan   Diagnoses and all orders for this visit:    1. Type 2 diabetes mellitus without complication, without long-term current use of insulin ([facility]) (Primary)  -     MicroAlbumin, Urine, Random - Urine, Clean Catch; Future  -     MicroAlbumin, Urine, Random - Urine, Clean Catch    2. Ear pain, bilateral  -     Ambulatory Referral to ENT (Otolaryngology)    3. Bilateral hearing loss, unspecified hearing loss type  -     Ambulatory Referral to ENT (Otolaryngology)    4. Pulmonary emphysema, unspecified emphysema type ([facility])  -     Cancel: COVID-19,APTIMA PANTHER([place]),[facility]NI, NP/OP SWAB IN UTM/VTM/SALINE TRANSPORT MEDIA,24 HR TAT - Swab, Nasopharynx    5. Immunization due  -     pneumococcal polysaccharide 23-valent (PNEUMOVAX-23) vaccine 0.5 mL    6. Primary hypertension    Other orders  -     Semaglutide (Rybelsus) 3 MG tablet; Take 1 tablet by mouth Daily.  Dispense: 30 tablet; Refill: 1  -     buPROPion (WELLBUTRIN) 75 MG tablet; Take 1 tablet by mouth 2 (Two) Times a Day.  Dispense: 60 tablet; Refill: 1  -     Budeson-Glycopyrrol-Formoterol (Breztri Aerosphere) 160-9-4.8 MCG/ACT aerosol inhaler; Inhale 2 puffs 2 (Two) Times a Day.  Dispense: 5.9 g; Refill: 0  -     fluconazole (Diflucan) 150 MG tablet; Take 1 tablet by mouth 1 (One) Time for 1 dose.  Dispense: 1 tablet; Refill: 0  -     ciprofloxacin-dexamethasone (Ciprodex) 0.3-0.1 % otic suspension; Administer 4 drops into both ears 2 (Two) Times a Day.   Dispense: 7.5 mL; Refill: 0  -     fluconazole (Diflucan) 100 MG tablet; Take 1 tablet by mouth Daily.  Dispense: 7 tablet; Refill: 1  -     empagliflozin (Jardiance) 10 MG tablet tablet; Take 1 tablet by mouth Daily.  Dispense: 30 tablet; Refill: 1  -     FluLaval/Fluarix/Fluzone >6 Months (9504-8323)  -     losartan (Cozaar) 25 MG tablet; Take 1 tablet by mouth Daily.  Dispense: 30 tablet; Refill: 1      ciprodes for OM  Refer to ent for hearing loss  Jardiance  And rybeslsus for DM  Rx diflucan for vaginitis  Stop smoking  Rx breztri for emphsema  Low carb diet   Weight loss recommended for good health

## 2022-09-01 ENCOUNTER — HOSPITAL ENCOUNTER (EMERGENCY)
Facility: HOSPITAL | Age: 42
Discharge: HOME | End: 2022-09-01
Attending: EMERGENCY MEDICINE | Admitting: EMERGENCY MEDICINE

## 2022-09-01 VITALS
TEMPERATURE: 98.4 F | HEART RATE: 81 BPM | OXYGEN SATURATION: 96 % | WEIGHT: 217.15 LBS | HEIGHT: 63 IN | DIASTOLIC BLOOD PRESSURE: 73 MMHG | BODY MASS INDEX: 38.48 KG/M2 | RESPIRATION RATE: 18 BRPM | SYSTOLIC BLOOD PRESSURE: 124 MMHG

## 2022-09-01 DIAGNOSIS — E11.65 TYPE 2 DIABETES MELLITUS WITH HYPERGLYCEMIA, WITHOUT LONG-TERM CURRENT USE OF INSULIN: Primary | ICD-10-CM

## 2022-09-01 LAB
ACETONE BLD QL: NEGATIVE
ALBUMIN SERPL-MCNC: 4 G/DL (ref 3.5–5.2)
ALBUMIN/GLOB SERPL: 1.4 G/DL
ALP SERPL-CCNC: 135 U/L (ref 39–117)
ALT SERPL W P-5'-P-CCNC: 24 U/L (ref 1–33)
ANION GAP SERPL CALCULATED.3IONS-SCNC: 13 MMOL/L (ref 5–15)
AST SERPL-CCNC: 20 U/L (ref 1–32)
BASOPHILS # BLD AUTO: 0.1 10*3/MM3 (ref 0–0.2)
BASOPHILS NFR BLD AUTO: 1 % (ref 0–1.5)
BILIRUB SERPL-MCNC: 0.4 MG/DL (ref 0–1.2)
BILIRUB UR QL STRIP: NEGATIVE
BUN SERPL-MCNC: 6 MG/DL (ref 6–20)
BUN/CREAT SERPL: 7.6 (ref 7–25)
CALCIUM SPEC-SCNC: 8.7 MG/DL (ref 8.6–10.5)
CHLORIDE SERPL-SCNC: 95 MMOL/L (ref 98–107)
CLARITY UR: CLEAR
CO2 SERPL-SCNC: 24 MMOL/L (ref 22–29)
COLOR UR: YELLOW
CREAT SERPL-MCNC: 0.79 MG/DL (ref 0.57–1)
DEPRECATED RDW RBC AUTO: 42.4 FL (ref 37–54)
EGFRCR SERPLBLD CKD-EPI 2021: 94.7 ML/MIN/1.73
EOSINOPHIL # BLD AUTO: 0.2 10*3/MM3 (ref 0–0.4)
EOSINOPHIL NFR BLD AUTO: 2.9 % (ref 0.3–6.2)
ERYTHROCYTE [DISTWIDTH] IN BLOOD BY AUTOMATED COUNT: 13.7 % (ref 12.3–15.4)
GLOBULIN UR ELPH-MCNC: 2.8 GM/DL
GLUCOSE BLDC GLUCOMTR-MCNC: 384 MG/DL (ref 70–105)
GLUCOSE BLDC GLUCOMTR-MCNC: 511 MG/DL (ref 70–105)
GLUCOSE BLDC GLUCOMTR-MCNC: >600 MG/DL (ref 70–105)
GLUCOSE SERPL-MCNC: 676 MG/DL (ref 65–99)
GLUCOSE UR STRIP-MCNC: ABNORMAL MG/DL
HCT VFR BLD AUTO: 46.1 % (ref 34–46.6)
HGB BLD-MCNC: 15.3 G/DL (ref 12–15.9)
HGB UR QL STRIP.AUTO: NEGATIVE
KETONES UR QL STRIP: NEGATIVE
LEUKOCYTE ESTERASE UR QL STRIP.AUTO: NEGATIVE
LYMPHOCYTES # BLD AUTO: 2 10*3/MM3 (ref 0.7–3.1)
LYMPHOCYTES NFR BLD AUTO: 28 % (ref 19.6–45.3)
MCH RBC QN AUTO: 29.7 PG (ref 26.6–33)
MCHC RBC AUTO-ENTMCNC: 33.2 G/DL (ref 31.5–35.7)
MCV RBC AUTO: 89.5 FL (ref 79–97)
MONOCYTES # BLD AUTO: 0.3 10*3/MM3 (ref 0.1–0.9)
MONOCYTES NFR BLD AUTO: 4.9 % (ref 5–12)
NEUTROPHILS NFR BLD AUTO: 4.5 10*3/MM3 (ref 1.7–7)
NEUTROPHILS NFR BLD AUTO: 63.2 % (ref 42.7–76)
NITRITE UR QL STRIP: NEGATIVE
NRBC BLD AUTO-RTO: 0 /100 WBC (ref 0–0.2)
PH UR STRIP.AUTO: 6 [PH] (ref 5–8)
PLATELET # BLD AUTO: 183 10*3/MM3 (ref 140–450)
PMV BLD AUTO: 9.6 FL (ref 6–12)
POTASSIUM SERPL-SCNC: 4 MMOL/L (ref 3.5–5.2)
PROT SERPL-MCNC: 6.8 G/DL (ref 6–8.5)
PROT UR QL STRIP: NEGATIVE
RBC # BLD AUTO: 5.15 10*6/MM3 (ref 3.77–5.28)
SODIUM SERPL-SCNC: 132 MMOL/L (ref 136–145)
SP GR UR STRIP: 1.03 (ref 1–1.03)
UROBILINOGEN UR QL STRIP: ABNORMAL
WBC NRBC COR # BLD: 7.1 10*3/MM3 (ref 3.4–10.8)

## 2022-09-01 PROCEDURE — 80053 COMPREHEN METABOLIC PANEL: CPT | Performed by: EMERGENCY MEDICINE

## 2022-09-01 PROCEDURE — 81003 URINALYSIS AUTO W/O SCOPE: CPT | Performed by: EMERGENCY MEDICINE

## 2022-09-01 PROCEDURE — 82962 GLUCOSE BLOOD TEST: CPT

## 2022-09-01 PROCEDURE — 82009 KETONE BODYS QUAL: CPT | Performed by: EMERGENCY MEDICINE

## 2022-09-01 PROCEDURE — 99283 EMERGENCY DEPT VISIT LOW MDM: CPT

## 2022-09-01 PROCEDURE — 85025 COMPLETE CBC W/AUTO DIFF WBC: CPT | Performed by: EMERGENCY MEDICINE

## 2022-09-01 PROCEDURE — 63710000001 INSULIN LISPRO (HUMAN) PER 5 UNITS: Performed by: EMERGENCY MEDICINE

## 2022-09-01 RX ORDER — INSULIN LISPRO 100 [IU]/ML
8 INJECTION, SOLUTION INTRAVENOUS; SUBCUTANEOUS ONCE
Status: COMPLETED | OUTPATIENT
Start: 2022-09-01 | End: 2022-09-01

## 2022-09-01 RX ORDER — SODIUM CHLORIDE 0.9 % (FLUSH) 0.9 %
10 SYRINGE (ML) INJECTION AS NEEDED
Status: DISCONTINUED | OUTPATIENT
Start: 2022-09-01 | End: 2022-09-01 | Stop reason: HOSPADM

## 2022-09-01 RX ADMIN — INSULIN LISPRO 8 UNITS: 100 INJECTION, SOLUTION INTRAVENOUS; SUBCUTANEOUS at 14:50

## 2022-09-01 RX ADMIN — SODIUM CHLORIDE 1000 ML: 9 INJECTION, SOLUTION INTRAVENOUS at 14:50

## 2022-09-01 RX ADMIN — SODIUM CHLORIDE 1000 ML: 9 INJECTION, SOLUTION INTRAVENOUS at 13:54

## 2022-09-01 NOTE — ED PROVIDER NOTES
Subjective   History of Present Illness  History Provided By: Pt    Chief Complaint: Hyperglycemia, thirsty, peeing often  Onset: Going for weeks  Timing: worsening recently  Location: Generalized  Quality: Feels like BS is high, checked last night and over 600 on relatives glucometer  Severity: moderate  Modifying Factors:  Drank some coca cola and sprite quite a bit recently.    Other:  No fevers or chills.  Recent yeast infection she self treated.    Review of Systems   Genitourinary: Positive for frequency.   All other systems reviewed and are negative.      Past Medical History:   Diagnosis Date   • Diabetes mellitus (HCC)    • Substance abuse (HCC)        No Known Allergies    Past Surgical History:   Procedure Laterality Date   • HYSTERECTOMY         Family History   Problem Relation Age of Onset   • Diabetes Father    • Diabetes Paternal Aunt    • Diabetes Paternal Uncle    • Cancer Paternal Grandmother        Social History     Socioeconomic History   • Marital status:    Tobacco Use   • Smoking status: Current Every Day Smoker     Packs/day: 1.00     Years: 29.00     Pack years: 29.00     Start date: 1992   • Smokeless tobacco: Never Used   Vaping Use   • Vaping Use: Never used   Substance and Sexual Activity   • Alcohol use: Not Currently   • Drug use: Yes     Frequency: 7.0 times per week     Comment: suboxone   • Sexual activity: Defer           Objective   Physical Exam  Constitutional:  No acute distress.  Head:  Atraumatic.  Normocephalic.   Eyes:  No scleral icterus. Normal conjunctiva  ENT:  Moist mucosa.  No nasal discharge present.  Cardiovascular:  Well perfused.  Equal pulses.  Regular rate.  Normal capillary refill.    Pulmonary/Chest:  No respiratory distress.  Airway patent.  No tachypnea.  No accessory muscle usage.    Abdominal:  Non-distended. Non-tender.   Back: No CVA ttp.  Extremities:  No peripheral edema.  No Deformity  Skin:  Warm, dry  Neurological:  Alert, awake, and  "appropriate.  Normal speech.      Procedures           ED Course      /96 (BP Location: Left arm, Patient Position: Sitting)   Pulse 102   Temp 98.4 °F (36.9 °C) (Oral)   Resp 20   Ht 160 cm (63\")   Wt 98.5 kg (217 lb 2.5 oz)   SpO2 98%   BMI 38.47 kg/m²   Labs Reviewed   COMPREHENSIVE METABOLIC PANEL - Abnormal; Notable for the following components:       Result Value    Glucose 676 (*)     Sodium 132 (*)     Chloride 95 (*)     Alkaline Phosphatase 135 (*)     All other components within normal limits    Narrative:     GFR Normal >60  Chronic Kidney Disease <60  Kidney Failure <15     URINALYSIS W/ MICROSCOPIC IF INDICATED (NO CULTURE) - Abnormal; Notable for the following components:    Specific Gravity, UA 1.035 (*)     Glucose, UA >=1000 mg/dL (3+) (*)     All other components within normal limits    Narrative:     Urine microscopic not indicated.   CBC WITH AUTO DIFFERENTIAL - Abnormal; Notable for the following components:    Monocyte % 4.9 (*)     All other components within normal limits   POCT GLUCOSE FINGERSTICK - Abnormal; Notable for the following components:    Glucose >600 (*)     All other components within normal limits   POCT GLUCOSE FINGERSTICK - Abnormal; Notable for the following components:    Glucose 511 (*)     All other components within normal limits   POCT GLUCOSE FINGERSTICK - Abnormal; Notable for the following components:    Glucose 384 (*)     All other components within normal limits   ACETONE - Normal   POCT GLUCOSE FINGERSTICK   CBC AND DIFFERENTIAL    Narrative:     The following orders were created for panel order CBC & Differential.  Procedure                               Abnormality         Status                     ---------                               -----------         ------                     CBC Auto Differential[[id number]]        Abnormal            Final result                 Please view results for these tests on the individual orders.   GOLD TOP - " SST   EXTRA TUBES    Narrative:     The following orders were created for panel order Extra Tubes.  Procedure                               Abnormality         Status                     ---------                               -----------         ------                     Gold Top - SST[[id number]]                                   Final result               Light Blue Top[[id number]]                                                                Please view results for these tests on the individual orders.   LIGHT BLUE TOP     Medications   sodium chloride 0.9 % flush 10 mL (has no administration in time range)   sodium chloride 0.9 % bolus 1,000 mL (0 mL Intravenous Stopped 9/1/22 1450)   sodium chloride 0.9 % bolus 1,000 mL (0 mL Intravenous Stopped 9/1/22 1554)   insulin lispro (ADMELOG) injection 8 Units (8 Units Subcutaneous Given 9/1/22 1450)     No radiology results for the last day                                       MDM  Pt previously on metformin but was switched off and then could not afford the new medication is not covered by insurance.  Never restarted metformin.  Blood sugar trending down.  Now in 300s.  Patient feels better.  Given 2 L of fluids.  Will restart on metformin.  Patient states she can follow-up with PCP in the next couple of days.  Return ER precautions discussed.  Final diagnoses:   Type 2 diabetes mellitus with hyperglycemia, without long-term current use of insulin (HCC)       ED Disposition  ED Disposition     ED Disposition   Discharge    Condition   Stable    Comment   --             Elizabeth Lema MD  [address] [facility] [place] [address] [phone]    In 3 days           Medication List      New Prescriptions    metFORMIN 500 MG tablet  Commonly known as: GLUCOPHAGE  Take 1 tablet by mouth 2 (Two) Times a Day With Meals for 30 days.           Where to Get Your Medications      These medications were sent to [facility], [place] -[phone] - [facility]  [place], [place] - [address] [address] - [phone]  - [phone] FX  [address] [facility] [place] [address]    Phone: [phone]   · metFORMIN 500 MG tablet          Jm Gonzales MD  09/01/22 5845

## 2022-10-04 ENCOUNTER — OFFICE VISIT (OUTPATIENT)
Dept: FAMILY MEDICINE CLINIC | Facility: CLINIC | Age: 42
End: 2022-10-04

## 2022-10-04 VITALS
HEIGHT: 62 IN | HEART RATE: 99 BPM | SYSTOLIC BLOOD PRESSURE: 141 MMHG | BODY MASS INDEX: 38.72 KG/M2 | RESPIRATION RATE: 16 BRPM | DIASTOLIC BLOOD PRESSURE: 84 MMHG | TEMPERATURE: 98 F | OXYGEN SATURATION: 98 % | WEIGHT: 210.4 LBS

## 2022-10-04 DIAGNOSIS — Z13.220 SCREENING FOR LIPID DISORDERS: ICD-10-CM

## 2022-10-04 DIAGNOSIS — Z13.0 SCREENING FOR ENDOCRINE, METABOLIC AND IMMUNITY DISORDER: ICD-10-CM

## 2022-10-04 DIAGNOSIS — Z00.00 ENCOUNTER FOR MEDICAL EXAMINATION TO ESTABLISH CARE: ICD-10-CM

## 2022-10-04 DIAGNOSIS — Z13.29 SCREENING FOR THYROID DISORDER: ICD-10-CM

## 2022-10-04 DIAGNOSIS — I10 PRIMARY HYPERTENSION: ICD-10-CM

## 2022-10-04 DIAGNOSIS — Z13.29 SCREENING FOR ENDOCRINE, METABOLIC AND IMMUNITY DISORDER: ICD-10-CM

## 2022-10-04 DIAGNOSIS — Z13.1 SCREENING FOR DIABETES MELLITUS: ICD-10-CM

## 2022-10-04 DIAGNOSIS — Z13.228 SCREENING FOR ENDOCRINE, METABOLIC AND IMMUNITY DISORDER: ICD-10-CM

## 2022-10-04 DIAGNOSIS — J43.9 PULMONARY EMPHYSEMA, UNSPECIFIED EMPHYSEMA TYPE: ICD-10-CM

## 2022-10-04 DIAGNOSIS — E11.9 TYPE 2 DIABETES MELLITUS WITHOUT COMPLICATION, WITHOUT LONG-TERM CURRENT USE OF INSULIN: Primary | ICD-10-CM

## 2022-10-04 PROCEDURE — T1015 CLINIC SERVICE: HCPCS | Performed by: REGISTERED NURSE

## 2022-10-04 PROCEDURE — 99215 OFFICE O/P EST HI 40 MIN: CPT | Performed by: REGISTERED NURSE

## 2022-10-04 RX ORDER — BUDESONIDE, GLYCOPYRROLATE, AND FORMOTEROL FUMARATE 160; 9; 4.8 UG/1; UG/1; UG/1
2 AEROSOL, METERED RESPIRATORY (INHALATION) 2 TIMES DAILY
Qty: 5.9 G | Refills: 3 | Status: SHIPPED | OUTPATIENT
Start: 2022-10-04

## 2022-10-04 RX ORDER — BUDESONIDE, GLYCOPYRROLATE, AND FORMOTEROL FUMARATE 160; 9; 4.8 UG/1; UG/1; UG/1
2 AEROSOL, METERED RESPIRATORY (INHALATION) 2 TIMES DAILY
Qty: 5.9 G | Refills: 0 | COMMUNITY
Start: 2022-10-04 | End: 2022-10-04

## 2022-10-04 NOTE — PROGRESS NOTES
Chief Complaint  Blood Sugar Problem (In hospital 09/01/2022 sugar was running 676. Started Metformin and is running average 330 and is out of all diabetic supplies. Had samples due to insurance. ), Medication Problem (Metformin is aggravating stomach.), and Med Refill (Needs refill on benzonatate and inhaler)    Subjective    History of Present Illness {CC  Problem List  Visit  Diagnosis   Encounters  Notes  Medications  Labs  Result Review Imaging  Media :23}     Greer Moses presents to [facility] for Blood Sugar Problem (In hospital 09/01/2022 sugar was running 676. Started Metformin and is running average 330 and is out of all diabetic supplies. Had samples due to insurance. ), Medication Problem (Metformin is aggravating stomach.), and Med Refill (Needs refill on benzonatate and inhaler).    History of Present Illness  Patient is a 42-year-old female who presents to the clinic today to establish care, with concerns of chronic history of diabetes type 2, hypertension, and pulmonary emphysema.  Patient denies any chest pain, shortness of breath, or any fevers.  Patient denies any known exposure to COVID, flu, or any other contagious illnesses.    In regards to diabetes, patient shares that she was prescribed metformin at her last visit.  She shares that she stopped taking this metformin several months ago.  She shares that she was not sure what to do about her diet and how to approach taking care of diabetes.  We discussed in depth information in regards to diabetic care.  I offered patient several pronounced will help with diabetes care.  I have given her information on Farxiga, a new drug that we are can start today.  I have given her information on diabetes and nutrition.  And I have also given her information on checking her glucose and how to keep track of that.  I have offered her a teaching visit with the nurse as well to go more in-depth with diabetic  management and we have discussed following up as well together in more depth at our next visit.  In regards to caring long-term organ to start Farxiga 5 mg daily and she will call me with her sugars after 2 weeks.  If this is not well managed on just 5 mg then we will bump up to 10 mg/day.    In regards to hypertension patient shares that she has not been taking her hypertension meds.  She was last prescribed losartan 25 mg.  She shares that she has not been taking this for a while.  I have sent over a renewal prescription for her to restart this losartan.  Patient denies any significant headaches or other concerns at this time.    In regards to pulmonary emphysema, patient shares that she started Breztri inhaler at her last visit.  She shares that this was very helpful.  She shares that she put in a request for refills and never did get them.  She would like refills today and would like to resume treatment.  I will happily send these over for her today.       Review of Systems   Constitutional: Positive for fatigue. Negative for activity change, chills and fever.   HENT: Negative.  Negative for congestion, dental problem, ear pain, hearing loss, rhinorrhea, sinus pain, sore throat, tinnitus and trouble swallowing.    Eyes: Negative.  Negative for pain and visual disturbance.   Respiratory: Negative.  Negative for cough, chest tightness, shortness of breath and wheezing.    Cardiovascular: Negative.  Negative for chest pain, palpitations and leg swelling.   Gastrointestinal: Negative.  Negative for abdominal pain, diarrhea, nausea and vomiting.   Endocrine: Positive for polydipsia, polyphagia and polyuria.   Genitourinary: Negative.  Negative for difficulty urinating, dysuria, frequency and urgency.   Musculoskeletal: Negative.  Negative for arthralgias, back pain and myalgias.   Skin: Negative.  Negative for color change, pallor, rash and wound.   Allergic/Immunologic: Negative.  Negative for environmental  "allergies.   Neurological: Negative.  Negative for dizziness, tremors, seizures, speech difficulty, weakness, light-headedness, numbness and headaches.   Hematological: Negative.    Psychiatric/Behavioral: Negative for confusion, decreased concentration, self-injury and suicidal ideas. The patient is nervous/anxious.    All other systems reviewed and are negative.       Objective     Vital Signs:   /84 (BP Location: Right arm, Patient Position: Sitting, Cuff Size: Adult)   Pulse 99   Temp 98 °F (36.7 °C) (Temporal)   Resp 16   Ht 157.5 cm (62\")   Wt 95.4 kg (210 lb 6.4 oz)   SpO2 98%   BMI 38.48 kg/m²   Current Outpatient Medications on File Prior to Visit   Medication Sig Dispense Refill   • Blood Glucose Monitoring Suppl (Accu-Chek Guide Me) w/Device kit 1 Device Daily. 1 kit 0   • Blood Glucose Monitoring Suppl ([employer] Blood Glucose Meter) w/Device kit 1 Device Daily. 1 kit 0   • buprenorphine-naloxone (SUBOXONE) 8-2 MG per SL tablet DISSOLVE 2 AND 1/2 TABLETS UNDER THE TONGUE DAILY     • Narcan 4 MG/0.1ML nasal spray      • [DISCONTINUED] Budeson-Glycopyrrol-Formoterol (Breztri Aerosphere) 160-9-4.8 MCG/ACT aerosol inhaler Inhale 2 puffs 2 (Two) Times a Day. 5.9 g 0   • [DISCONTINUED] fluconazole (Diflucan) 100 MG tablet Take 1 tablet by mouth Daily. 7 tablet 1   • buPROPion (WELLBUTRIN) 75 MG tablet Take 1 tablet by mouth 2 (Two) Times a Day. 60 tablet 1   • losartan (Cozaar) 25 MG tablet Take 1 tablet by mouth Daily. 30 tablet 1   • [DISCONTINUED] benzonatate (TESSALON) 200 MG capsule Take 1 capsule by mouth 3 (Three) Times a Day As Needed for Cough. 45 capsule 1   • [DISCONTINUED] ciprofloxacin-dexamethasone (Ciprodex) 0.3-0.1 % otic suspension Administer 4 drops into both ears 2 (Two) Times a Day. 7.5 mL 0   • [DISCONTINUED] doxycycline (ADOXA) 100 MG tablet Take 1 tablet by mouth 2 (Two) Times a Day. 20 tablet 0   • [DISCONTINUED] empagliflozin (Jardiance) 10 MG tablet tablet Take 1 tablet by " mouth Daily. 30 tablet 1   • [DISCONTINUED] metFORMIN (GLUCOPHAGE) 500 MG tablet Take 1 tablet by mouth 2 (Two) Times a Day With Meals for 30 days. 60 tablet 0   • [DISCONTINUED] neomycin-polymyxin-hydrocortisone (CORTISPORIN) 1 % solution otic solution Administer 3 drops into both ears 4 (Four) Times a Day. 10 mL 0   • [DISCONTINUED] Semaglutide (Rybelsus) 3 MG tablet Take 1 tablet by mouth Daily. 30 tablet 1     No current facility-administered medications on file prior to visit.        Past Medical History:   Diagnosis Date   • Diabetes mellitus (HCC)    • Substance abuse (HCC)       Past Surgical History:   Procedure Laterality Date   • HYSTERECTOMY        Family History   Problem Relation Age of Onset   • Diabetes Father    • Diabetes Paternal Aunt    • Diabetes Paternal Uncle    • Cancer Paternal Grandmother       Social History     Socioeconomic History   • Marital status:    Tobacco Use   • Smoking status: Current Every Day Smoker     Packs/day: 1.00     Years: 29.00     Pack years: 29.00     Start date: 1992   • Smokeless tobacco: Never Used   Vaping Use   • Vaping Use: Never used   Substance and Sexual Activity   • Alcohol use: Not Currently   • Drug use: Yes     Frequency: 7.0 times per week     Comment: suboxone   • Sexual activity: Defer         Admission on 09/01/2022, Discharged on 09/01/2022   Component Date Value Ref Range Status   • Glucose 09/01/2022 >600 (A) 70 - 105 mg/dL Final    Serial Number: [id number]:  [id number]   • Glucose 09/01/2022 676 (A) 65 - 99 mg/dL Final   • BUN 09/01/2022 6  6 - 20 mg/dL Final   • Creatinine 09/01/2022 0.79  0.57 - 1.00 mg/dL Final   • Sodium 09/01/2022 132 (A) 136 - 145 mmol/L Final   • Potassium 09/01/2022 4.0  3.5 - 5.2 mmol/L Final   • Chloride 09/01/2022 95 (A) 98 - 107 mmol/L Final   • CO2 09/01/2022 24.0  22.0 - 29.0 mmol/L Final   • Calcium 09/01/2022 8.7  8.6 - 10.5 mg/dL Final   • Total Protein 09/01/2022 6.8  6.0 - 8.5 g/dL Final   •  Albumin 09/01/2022 4.00  3.50 - 5.20 g/dL Final   • ALT (SGPT) 09/01/2022 24  1 - 33 U/L Final   • AST (SGOT) 09/01/2022 20  1 - 32 U/L Final   • Alkaline Phosphatase 09/01/2022 135 (A) 39 - 117 U/L Final   • Total Bilirubin 09/01/2022 0.4  0.0 - 1.2 mg/dL Final   • Globulin 09/01/2022 2.8  gm/dL Final   • A/G Ratio 09/01/2022 1.4  g/dL Final   • BUN/Creatinine Ratio 09/01/2022 7.6  7.0 - 25.0 Final   • Anion Gap 09/01/2022 13.0  5.0 - 15.0 mmol/L Final   • eGFR 09/01/2022 94.7  >60.0 mL/min/1.73 Final    National Kidney Foundation and American Society of Nephrology (ASN) Task Force recommended calculation based on the Chronic Kidney Disease Epidemiology Collaboration (CKD-EPI) equation refit without adjustment for race.   • Acetone 09/01/2022 Negative  Negative Final   • Color, UA 09/01/2022 Yellow  Yellow, Straw Final   • Appearance, UA 09/01/2022 Clear  Clear Final   • pH, UA 09/01/2022 6.0  5.0 - 8.0 Final   • Specific Gravity, UA 09/01/2022 1.035 (A) 1.005 - 1.030 Final   • Glucose, UA 09/01/2022 >=1000 mg/dL (3+) (A) Negative Final   • Ketones, UA 09/01/2022 Negative  Negative Final   • Bilirubin, UA 09/01/2022 Negative  Negative Final   • Blood, UA 09/01/2022 Negative  Negative Final   • Protein, UA 09/01/2022 Negative  Negative Final   • Leuk Esterase, UA 09/01/2022 Negative  Negative Final   • Nitrite, UA 09/01/2022 Negative  Negative Final   • Urobilinogen, UA 09/01/2022 1.0 E.U./dL  0.2 - 1.0 E.U./dL Final   • WBC 09/01/2022 7.10  3.40 - 10.80 10*3/mm3 Final   • RBC 09/01/2022 5.15  3.77 - 5.28 10*6/mm3 Final   • Hemoglobin 09/01/2022 15.3  12.0 - 15.9 g/dL Final   • Hematocrit 09/01/2022 46.1  34.0 - 46.6 % Final   • MCV 09/01/2022 89.5  79.0 - 97.0 fL Final   • MCH 09/01/2022 29.7  26.6 - 33.0 pg Final   • MCHC 09/01/2022 33.2  31.5 - 35.7 g/dL Final   • RDW 09/01/2022 13.7  12.3 - 15.4 % Final   • RDW-SD 09/01/2022 42.4  37.0 - 54.0 fl Final   • MPV 09/01/2022 9.6  6.0 - 12.0 fL Final   • Platelets  09/01/2022 183  140 - 450 10*3/mm3 Final   • Neutrophil % 09/01/2022 63.2  42.7 - 76.0 % Final   • Lymphocyte % 09/01/2022 28.0  19.6 - 45.3 % Final   • Monocyte % 09/01/2022 4.9 (A) 5.0 - 12.0 % Final   • Eosinophil % 09/01/2022 2.9  0.3 - 6.2 % Final   • Basophil % 09/01/2022 1.0  0.0 - 1.5 % Final   • Neutrophils, Absolute 09/01/2022 4.50  1.70 - 7.00 10*3/mm3 Final   • Lymphocytes, Absolute 09/01/2022 2.00  0.70 - 3.10 10*3/mm3 Final   • Monocytes, Absolute 09/01/2022 0.30  0.10 - 0.90 10*3/mm3 Final   • Eosinophils, Absolute 09/01/2022 0.20  0.00 - 0.40 10*3/mm3 Final   • Basophils, Absolute 09/01/2022 0.10  0.00 - 0.20 10*3/mm3 Final   • nRBC 09/01/2022 0.0  0.0 - 0.2 /100 WBC Final   • Glucose 09/01/2022 511 (A) 70 - 105 mg/dL Final    Serial Number: [id number]:  [id number]   • Glucose 09/01/2022 384 (A) 70 - 105 mg/dL Final    Serial Number: [id number]:  [id number]         Physical Exam  Vitals and nursing note reviewed.   Constitutional:       Appearance: Normal appearance. She is normal weight.   HENT:      Head: Normocephalic and atraumatic.   Cardiovascular:      Rate and Rhythm: Normal rate and regular rhythm.      Pulses: Normal pulses.      Heart sounds: Normal heart sounds. No murmur heard.    No friction rub. No gallop.   Pulmonary:      Effort: Pulmonary effort is normal. No respiratory distress.      Breath sounds: Normal breath sounds. No stridor. No wheezing, rhonchi or rales.   Chest:      Chest wall: No tenderness.   Abdominal:      General: Abdomen is flat. Bowel sounds are normal. There is no distension.      Palpations: Abdomen is soft. There is no mass.      Tenderness: There is no abdominal tenderness. There is no right CVA tenderness, left CVA tenderness, guarding or rebound.      Hernia: No hernia is present.   Skin:     General: Skin is warm and dry.      Capillary Refill: Capillary refill takes less than 2 seconds.      Coloration: Skin is not jaundiced or pale.    Neurological:      General: No focal deficit present.      Mental Status: She is alert and oriented to person, place, and time. Mental status is at baseline.      Motor: No weakness.      Coordination: Coordination normal.      Gait: Gait normal.   Psychiatric:         Mood and Affect: Mood normal.         Behavior: Behavior normal.         Thought Content: Thought content normal.         Judgment: Judgment normal.          Result Review  Data Reviewed:{ Labs  Result Review  Imaging  Med Tab  Media :23}   I have reviewed this patient's chart.  I have reviewed previous labs, previous imaging, previous medications, and previous encounters with notes that were available in this patient's chart.           Assessment and Plan {CC Problem List  Visit Diagnosis  ROS  Review (Popup)  [facility]  BestPractice  Medications  SmartSets  SnapShot Encounters  Media :23}   Diagnoses and all orders for this visit:    1. Type 2 diabetes mellitus without complication, without long-term current use of insulin (HCC) (Primary)  -     dapagliflozin Propanediol 10 MG tablet; Take 10 mg by mouth Daily.  Dispense: 30 tablet; Refill: 5  -     Hemoglobin A1c; Future  -     Comprehensive Metabolic Panel; Future  -     CBC & Differential; Future  -     glucose blood test strip; Use as instructed  Dispense: 100 each; Refill: 12    2. Screening for diabetes mellitus  -     Hemoglobin A1c; Future    3. Screening for endocrine, metabolic and immunity disorder  -     Comprehensive Metabolic Panel; Future  -     CBC & Differential; Future    4. Screening for lipid disorders  -     Lipid Panel; Future    5. Screening for thyroid disorder  -     TSH; Future    6. Pulmonary emphysema, unspecified emphysema type (HCC)  -     Discontinue: Budeson-Glycopyrrol-Formoterol (Breztri Aerosphere) 160-9-4.8 MCG/ACT aerosol inhaler; Inhale 2 puffs 2 (Two) Times a Day.  Dispense: 5.9 g; Refill: 0  -      Budeson-Glycopyrrol-Formoterol (Breztri Aerosphere) 160-9-4.8 MCG/ACT aerosol inhaler; Inhale 2 puffs 2 (Two) Times a Day.  Dispense: 5.9 g; Refill: 3    7. Primary hypertension    8. Encounter for medical examination to establish care      -Patient requested to stop metformin due to GI upset.  -Initiating Farxiga for diabetic control.  Discussed starting on 5 mg and then increasing to 10 if patient tolerates well, 5 mg sample sent with patient.  -Fasting blood work tomorrow.  -Follow-up in 1 month to ensure patient's medications going well her diabetes is better under control.    I spent 60 minutes caring for Greer on this date of service. This time includes time spent by me in the following activities:preparing for the visit, reviewing tests, obtaining and/or reviewing a separately obtained history, performing a medically appropriate examination and/or evaluation , counseling and educating the patient/family/caregiver, ordering medications, tests, or procedures, referring and communicating with other health care professionals , documenting information in the medical record, independently interpreting results and communicating that information with the patient/family/caregiver and care coordination.    Follow Up {Instructions Charge Capture  Follow-up Communications :23}     Patient was given instructions and counseling regarding her condition or for health maintenance advice. Please see specific information pulled into the AVS (placed there by myself) if appropriate.    No follow-ups on file.    MDM  Number of Diagnoses or Management Options  Encounter for medical examination to establish care: new, needed workup  Primary hypertension: established, worsening  Pulmonary emphysema, unspecified emphysema type (HCC): established, worsening  Screening for diabetes mellitus: established, improving  Screening for endocrine, metabolic and immunity disorder: established, improving  Screening for lipid disorders:  established, improving  Screening for thyroid disorder: established, improving  Type 2 diabetes mellitus without complication, without long-term current use of insulin (HCC): established, worsening     Amount and/or Complexity of Data Reviewed  Clinical lab tests: ordered and reviewed  Tests in the radiology section of CPT®: reviewed  Tests in the medicine section of CPT®: reviewed  Discussion of test results with the performing providers: no  Decide to obtain previous medical records or to obtain history from someone other than the patient: yes  Obtain history from someone other than the patient: no  Review and summarize past medical records: yes  Discuss the patient with other providers: no  Independent visualization of images, tracings, or specimens: no    Risk of Complications, Morbidity, and/or Mortality  Presenting problems: high  Diagnostic procedures: low  Management options: high    Critical Care  Total time providing critical care: 30-74 minutes    Patient Progress  Patient progress: stable       Class 2 Severe Obesity (BMI >=35 and <=39.9). Obesity-related health conditions include the following: hypertension and diabetes mellitus. Obesity is unchanged. BMI is is above average; BMI management plan is completed. We discussed low calorie, low carb based diet program, portion control and increasing exercise.       WALESKA Mcgovern, FNP-BC

## 2022-11-03 DIAGNOSIS — E11.9 TYPE 2 DIABETES MELLITUS WITHOUT COMPLICATION, WITHOUT LONG-TERM CURRENT USE OF INSULIN: ICD-10-CM

## 2022-11-03 NOTE — TELEPHONE ENCOUNTER
Rx Refill Note    PROTOCOLS NOT MET     Requested Prescriptions     Pending Prescriptions Disp Refills   • dapagliflozin Propanediol 10 MG tablet 30 tablet 5     Sig: Take 10 mg by mouth Daily.   • glucose blood test strip 100 each 12     Sig: Use as instructed      Last office visit with prescribing clinician: 10/4/2022      Next office visit with prescribing clinician: 11/8/2022            Shantal Mckeon MA  11/03/22, 11:09 EDT

## 2022-11-03 NOTE — TELEPHONE ENCOUNTER
[facility] is requesting a refill for Fluconazole 100mg tablets. Can you please send this in? I do not see this on her med list.     Thanks!

## 2022-11-08 ENCOUNTER — OFFICE VISIT (OUTPATIENT)
Dept: FAMILY MEDICINE CLINIC | Facility: CLINIC | Age: 42
End: 2022-11-08

## 2022-11-08 VITALS
HEIGHT: 63 IN | DIASTOLIC BLOOD PRESSURE: 83 MMHG | BODY MASS INDEX: 36.79 KG/M2 | OXYGEN SATURATION: 98 % | HEART RATE: 93 BPM | SYSTOLIC BLOOD PRESSURE: 133 MMHG | WEIGHT: 207.6 LBS | TEMPERATURE: 97.7 F | RESPIRATION RATE: 18 BRPM

## 2022-11-08 DIAGNOSIS — B37.2 CANDIDAL SKIN INFECTION: ICD-10-CM

## 2022-11-08 DIAGNOSIS — H53.9 VISION CHANGES: ICD-10-CM

## 2022-11-08 DIAGNOSIS — E11.9 TYPE 2 DIABETES MELLITUS WITHOUT COMPLICATION, WITHOUT LONG-TERM CURRENT USE OF INSULIN: Primary | ICD-10-CM

## 2022-11-08 LAB
EXPIRATION DATE: NORMAL
HBA1C MFR BLD: 11.1 %
Lab: NORMAL

## 2022-11-08 PROCEDURE — 3046F HEMOGLOBIN A1C LEVEL >9.0%: CPT | Performed by: REGISTERED NURSE

## 2022-11-08 PROCEDURE — 99215 OFFICE O/P EST HI 40 MIN: CPT | Performed by: REGISTERED NURSE

## 2022-11-08 PROCEDURE — 83036 HEMOGLOBIN GLYCOSYLATED A1C: CPT | Performed by: REGISTERED NURSE

## 2022-11-08 PROCEDURE — T1015 CLINIC SERVICE: HCPCS | Performed by: REGISTERED NURSE

## 2022-11-08 RX ORDER — TIRZEPATIDE 2.5 MG/.5ML
2.5 INJECTION, SOLUTION SUBCUTANEOUS WEEKLY
Qty: 2 ML | Refills: 0 | Status: SHIPPED | OUTPATIENT
Start: 2022-11-08 | End: 2023-01-06

## 2022-11-08 RX ORDER — LANCETS
EACH MISCELLANEOUS SEE ADMIN INSTRUCTIONS
COMMUNITY
Start: 2022-10-06

## 2022-11-08 RX ORDER — FLUCONAZOLE 150 MG/1
150 TABLET ORAL ONCE
Qty: 1 TABLET | Refills: 1 | Status: SHIPPED | OUTPATIENT
Start: 2022-11-08 | End: 2022-11-08

## 2022-11-08 RX ORDER — TIRZEPATIDE 5 MG/.5ML
5 INJECTION, SOLUTION SUBCUTANEOUS WEEKLY
Qty: 6 ML | Refills: 1 | Status: SHIPPED | OUTPATIENT
Start: 2022-11-08 | End: 2023-01-06

## 2022-11-08 NOTE — PROGRESS NOTES
Chief Complaint  Diabetes (1 month f/u)    Subjective    History of Present Illness {CC  Problem List  Visit  Diagnosis   Encounters  Notes  Medications  Labs  Result Review Imaging  Media :23}     Greer Moses presents to [facility] for Diabetes (1 month f/u).    History of Present Illness  Patient is a 42-year-old female presents to clinic today for 1 month follow-up of diabetes, with concerns regarding vision changes and a yeast infection greater than 1 week.  Patient denies any chest pain, shortness of breath, or any fevers.  Patient denies any known exposure to COVID, flu, or any other contagious illnesses.    Regards to diabetes, patient is currently taking Farxiga 10 mg for this.  Patient was taking metformin previously but could not tolerate due to extreme GI upset.  Patient shares that her blood sugars at home have been approximately around 300 on average.  She does share that she has had some spikes as high as 500 but it typically runs around 300 even when she is waking up in the morning fasting.  Patient is agreeable to A1c today.  A1c in clinic was 13.1.  We discussed options and patient is agreeable and starting Mounjaro today.    Diabetes  She presents for her follow-up diabetic visit. She has type 2 diabetes mellitus. No MedicAlert identification noted. The initial diagnosis of diabetes was made 11 Months ago. Her disease course has been worsening. Hypoglycemia symptoms include headaches and hunger. Pertinent negatives for hypoglycemia include no confusion, dizziness, mood changes, nervousness/anxiousness, pallor, seizures, sleepiness, speech difficulty, sweats or tremors. Associated symptoms include blurred vision, fatigue, polydipsia, polyphagia, polyuria and visual change. Pertinent negatives for diabetes include no chest pain, no foot paresthesias, no foot ulcerations, no weakness and no weight loss. Pertinent negatives for hypoglycemia complications  include no blackouts, no hospitalization, no nocturnal hypoglycemia, no required assistance and no required glucagon injection. Symptoms are improving. Pertinent negatives for diabetic complications include no CVA, heart disease, impotence, nephropathy, peripheral neuropathy, PVD or retinopathy. Risk factors for coronary artery disease include obesity, post-menopausal, stress and tobacco exposure. Current diabetic treatment includes diet and oral agent (dual therapy). She is compliant with treatment most of the time. Her weight is stable. She is following a generally healthy diet. Meal planning includes carbohydrate counting. She has not had a previous visit with a dietitian. She participates in exercise daily. She monitors blood glucose at home 1-2 x per day. She monitors urine at home <1 x per month. Blood glucose monitoring compliance is good. Her home blood glucose trend is fluctuating minimally. Her breakfast blood glucose is taken between 8-9 am. Her breakfast blood glucose range is generally >200 mg/dl. Her lunch blood glucose is taken after 3 pm. Her lunch blood glucose range is generally >200 mg/dl. Her dinner blood glucose is taken between 6-7 pm. Her dinner blood glucose range is generally >200 mg/dl. Her highest blood glucose is >200 mg/dl. Her overall blood glucose range is >200 mg/dl. She does not see a podiatrist.Eye exam is not current.        Review of Systems   Constitutional: Positive for fatigue. Negative for activity change, chills, fever and weight loss.   HENT: Negative for congestion, dental problem, ear pain, hearing loss, rhinorrhea, sinus pain, sore throat, tinnitus and trouble swallowing.    Eyes: Positive for blurred vision and visual disturbance (Blurred vision). Negative for pain.   Respiratory: Negative.  Negative for cough, chest tightness, shortness of breath and wheezing.    Cardiovascular: Negative.  Negative for chest pain, palpitations and leg swelling.   Gastrointestinal:  "Negative.  Negative for abdominal pain, diarrhea, nausea and vomiting.   Endocrine: Positive for polydipsia, polyphagia and polyuria.   Genitourinary: Negative.  Negative for difficulty urinating, dysuria, frequency, impotence and urgency.   Musculoskeletal: Negative.  Negative for arthralgias, back pain and myalgias.   Skin: Negative.  Negative for color change, pallor, rash and wound.   Allergic/Immunologic: Negative.  Negative for environmental allergies.   Neurological: Positive for headaches. Negative for dizziness, tremors, seizures, speech difficulty, weakness, light-headedness and numbness.   Hematological: Negative.    Psychiatric/Behavioral: Negative.  Negative for confusion, decreased concentration, self-injury and suicidal ideas. The patient is not nervous/anxious.    All other systems reviewed and are negative.       Objective     Vital Signs:   /83 (BP Location: Right arm, Patient Position: Sitting, Cuff Size: Adult)   Pulse 93   Temp 97.7 °F (36.5 °C) (Temporal)   Resp 18   Ht 160 cm (63\")   Wt 94.2 kg (207 lb 9.6 oz)   SpO2 98%   BMI 36.77 kg/m²   Current Outpatient Medications on File Prior to Visit   Medication Sig Dispense Refill   • Accu-Chek Softclix Lancets lancets See Admin Instructions.     • Blood Glucose Monitoring Suppl (Accu-Chek Guide Me) w/Device kit 1 Device Daily. 1 kit 0   • Blood Glucose Monitoring Suppl ([employer] Blood Glucose Meter) w/Device kit 1 Device Daily. 1 kit 0   • buprenorphine-naloxone (SUBOXONE) 8-2 MG per SL tablet DISSOLVE 2 AND 1/2 TABLETS UNDER THE TONGUE DAILY     • dapagliflozin Propanediol 10 MG tablet Take 10 mg by mouth Daily. 30 tablet 5   • glucose blood test strip Use as instructed 100 each 12   • losartan (Cozaar) 25 MG tablet Take 1 tablet by mouth Daily. 30 tablet 1   • Narcan 4 MG/0.1ML nasal spray      • Budeson-Glycopyrrol-Formoterol (Breztri Aerosphere) 160-9-4.8 MCG/ACT aerosol inhaler Inhale 2 puffs 2 (Two) Times a Day. 5.9 g 3   • " [DISCONTINUED] buPROPion (WELLBUTRIN) 75 MG tablet Take 1 tablet by mouth 2 (Two) Times a Day. 60 tablet 1     No current facility-administered medications on file prior to visit.        Past Medical History:   Diagnosis Date   • Diabetes mellitus (HCC)    • Substance abuse (HCC)       Past Surgical History:   Procedure Laterality Date   • HYSTERECTOMY        Family History   Problem Relation Age of Onset   • Diabetes Father    • Diabetes Paternal Aunt    • Diabetes Paternal Uncle    • Cancer Paternal Grandmother       Social History     Socioeconomic History   • Marital status:    Tobacco Use   • Smoking status: Every Day     Packs/day: 1.00     Years: 29.00     Pack years: 29.00     Types: Cigarettes     Start date: 1992   • Smokeless tobacco: Never   Vaping Use   • Vaping Use: Never used   Substance and Sexual Activity   • Alcohol use: Not Currently   • Drug use: Yes     Frequency: 7.0 times per week     Comment: suboxone   • Sexual activity: Defer         Office Visit on 11/08/2022   Component Date Value Ref Range Status   • Hemoglobin A1C 11/08/2022 11.1  % Final   • Lot Number 11/08/2022 [id number]   Final   • Expiration Date 11/08/2022 07/04/2024   Final   Admission on 09/01/2022, Discharged on 09/01/2022   Component Date Value Ref Range Status   • Glucose 09/01/2022 >600 (C)  70 - 105 mg/dL Final    Serial Number: [id number]:  [id number]   • Glucose 09/01/2022 676 (C)  65 - 99 mg/dL Final   • BUN 09/01/2022 6  6 - 20 mg/dL Final   • Creatinine 09/01/2022 0.79  0.57 - 1.00 mg/dL Final   • Sodium 09/01/2022 132 (L)  136 - 145 mmol/L Final   • Potassium 09/01/2022 4.0  3.5 - 5.2 mmol/L Final   • Chloride 09/01/2022 95 (L)  98 - 107 mmol/L Final   • CO2 09/01/2022 24.0  22.0 - 29.0 mmol/L Final   • Calcium 09/01/2022 8.7  8.6 - 10.5 mg/dL Final   • Total Protein 09/01/2022 6.8  6.0 - 8.5 g/dL Final   • Albumin 09/01/2022 4.00  3.50 - 5.20 g/dL Final   • ALT (SGPT) 09/01/2022 24  1 - 33 U/L Final   •  AST (SGOT) 09/01/2022 20  1 - 32 U/L Final   • Alkaline Phosphatase 09/01/2022 135 (H)  39 - 117 U/L Final   • Total Bilirubin 09/01/2022 0.4  0.0 - 1.2 mg/dL Final   • Globulin 09/01/2022 2.8  gm/dL Final   • A/G Ratio 09/01/2022 1.4  g/dL Final   • BUN/Creatinine Ratio 09/01/2022 7.6  7.0 - 25.0 Final   • Anion Gap 09/01/2022 13.0  5.0 - 15.0 mmol/L Final   • eGFR 09/01/2022 94.7  >60.0 mL/min/1.73 Final    National Kidney Foundation and American Society of Nephrology (ASN) Task Force recommended calculation based on the Chronic Kidney Disease Epidemiology Collaboration (CKD-EPI) equation refit without adjustment for race.   • Acetone 09/01/2022 Negative  Negative Final   • Color, UA 09/01/2022 Yellow  Yellow, Straw Final   • Appearance, UA 09/01/2022 Clear  Clear Final   • pH, UA 09/01/2022 6.0  5.0 - 8.0 Final   • Specific Gravity, UA 09/01/2022 1.035 (H)  1.005 - 1.030 Final   • Glucose, UA 09/01/2022 >=1000 mg/dL (3+) (A)  Negative Final   • Ketones, UA 09/01/2022 Negative  Negative Final   • Bilirubin, UA 09/01/2022 Negative  Negative Final   • Blood, UA 09/01/2022 Negative  Negative Final   • Protein, UA 09/01/2022 Negative  Negative Final   • Leuk Esterase, UA 09/01/2022 Negative  Negative Final   • Nitrite, UA 09/01/2022 Negative  Negative Final   • Urobilinogen, UA 09/01/2022 1.0 E.U./dL  0.2 - 1.0 E.U./dL Final   • WBC 09/01/2022 7.10  3.40 - 10.80 10*3/mm3 Final   • RBC 09/01/2022 5.15  3.77 - 5.28 10*6/mm3 Final   • Hemoglobin 09/01/2022 15.3  12.0 - 15.9 g/dL Final   • Hematocrit 09/01/2022 46.1  34.0 - 46.6 % Final   • MCV 09/01/2022 89.5  79.0 - 97.0 fL Final   • MCH 09/01/2022 29.7  26.6 - 33.0 pg Final   • MCHC 09/01/2022 33.2  31.5 - 35.7 g/dL Final   • RDW 09/01/2022 13.7  12.3 - 15.4 % Final   • RDW-SD 09/01/2022 42.4  37.0 - 54.0 fl Final   • MPV 09/01/2022 9.6  6.0 - 12.0 fL Final   • Platelets 09/01/2022 183  140 - 450 10*3/mm3 Final   • Neutrophil % 09/01/2022 63.2  42.7 - 76.0 % Final   •  Lymphocyte % 09/01/2022 28.0  19.6 - 45.3 % Final   • Monocyte % 09/01/2022 4.9 (L)  5.0 - 12.0 % Final   • Eosinophil % 09/01/2022 2.9  0.3 - 6.2 % Final   • Basophil % 09/01/2022 1.0  0.0 - 1.5 % Final   • Neutrophils, Absolute 09/01/2022 4.50  1.70 - 7.00 10*3/mm3 Final   • Lymphocytes, Absolute 09/01/2022 2.00  0.70 - 3.10 10*3/mm3 Final   • Monocytes, Absolute 09/01/2022 0.30  0.10 - 0.90 10*3/mm3 Final   • Eosinophils, Absolute 09/01/2022 0.20  0.00 - 0.40 10*3/mm3 Final   • Basophils, Absolute 09/01/2022 0.10  0.00 - 0.20 10*3/mm3 Final   • nRBC 09/01/2022 0.0  0.0 - 0.2 /100 WBC Final   • Glucose 09/01/2022 511 (C)  70 - 105 mg/dL Final    Serial Number: [id number]:  [id number]   • Glucose 09/01/2022 384 (H)  70 - 105 mg/dL Final    Serial Number: [id number]:  [id number]         Physical Exam  Vitals and nursing note reviewed.   Constitutional:       Appearance: Normal appearance. She is normal weight.   HENT:      Head: Normocephalic and atraumatic.   Cardiovascular:      Rate and Rhythm: Normal rate and regular rhythm.      Pulses: Normal pulses.      Heart sounds: Normal heart sounds. No murmur heard.    No friction rub. No gallop.   Pulmonary:      Effort: Pulmonary effort is normal. No respiratory distress.      Breath sounds: Normal breath sounds. No stridor. No wheezing, rhonchi or rales.   Chest:      Chest wall: No tenderness.   Abdominal:      General: Abdomen is flat. Bowel sounds are normal. There is no distension.      Palpations: Abdomen is soft. There is no mass.      Tenderness: There is no abdominal tenderness. There is no right CVA tenderness, left CVA tenderness, guarding or rebound.      Hernia: No hernia is present.   Skin:     General: Skin is warm and dry.      Capillary Refill: Capillary refill takes less than 2 seconds.      Coloration: Skin is not jaundiced or pale.      Findings: Erythema present.          Neurological:      General: No focal deficit present.       Mental Status: She is alert and oriented to person, place, and time. Mental status is at baseline.      Motor: No weakness.      Coordination: Coordination normal.      Gait: Gait normal.   Psychiatric:         Mood and Affect: Mood normal.         Behavior: Behavior normal.         Thought Content: Thought content normal.         Judgment: Judgment normal.          Result Review  Data Reviewed:{ Labs  Result Review  Imaging  Med Tab  Media :23}   I have reviewed this patient's chart.  I have reviewed previous labs, previous imaging, previous medications, and previous encounters with notes that were available in this patient's chart.           Assessment and Plan {CC Problem List  Visit Diagnosis  ROS  Review (Popup)  [facility]  BestPractice  Medications  SmartSets  SnapShot Encounters  Media :23}   Diagnoses and all orders for this visit:    1. Type 2 diabetes mellitus without complication, without long-term current use of insulin (HCC) (Primary)  -     Tirzepatide (Mounjaro) 2.5 MG/0.5ML solution pen-injector; Inject 2.5 mg under the skin into the appropriate area as directed 1 (One) Time Per Week.  Dispense: 2 mL; Refill: 0  -     Ambulatory Referral to Optometry  -     Tirzepatide (Mounjaro) 5 MG/0.5ML solution pen-injector; Inject 0.5 mL under the skin into the appropriate area as directed 1 (One) Time Per Week.  Dispense: 6 mL; Refill: 1  -     POC Glycosylated Hemoglobin (Hb A1C)    2. Vision changes  -     Ambulatory Referral to Optometry    3. Candidal skin infection  -     fluconazole (Diflucan) 150 MG tablet; Take 1 tablet by mouth 1 (One) Time for 1 dose.  Dispense: 1 tablet; Refill: 1    -Starting Mounjaro 2.5 mg weekly for 4 weeks.  Then increasing to 5 mg weekly.  -Hemoglobin A1c was 13.1 in clinic today.  Discussed the significance and importance of lowering this A1c.  Recommend patient keep a log of her glucose and bring this to her next visit with her in 4  weeks.  -Referral to optometry to do diabetic eye screening and vision screening.  -Follow-up in 4 weeks or sooner if needed.    I spent 40 minutes caring for Greer on this date of service. This time includes time spent by me in the following activities:preparing for the visit, reviewing tests, obtaining and/or reviewing a separately obtained history, performing a medically appropriate examination and/or evaluation , counseling and educating the patient/family/caregiver, ordering medications, tests, or procedures, referring and communicating with other health care professionals , documenting information in the medical record, independently interpreting results and communicating that information with the patient/family/caregiver and care coordination.     Follow Up {Instructions Charge Capture  Follow-up Communications :23}     Patient was given instructions and counseling regarding her condition or for health maintenance advice. Please see specific information pulled into the AVS (placed there by myself) if appropriate.    Return in about 4 weeks (around 12/6/2022) for Recheck diabetes.    MDM  Number of Diagnoses or Management Options  Candidal skin infection: new, needed workup  Type 2 diabetes mellitus without complication, without long-term current use of insulin (HCC): established, worsening  Vision changes: established, worsening     Amount and/or Complexity of Data Reviewed  Clinical lab tests: ordered and reviewed  Tests in the radiology section of CPT®: reviewed  Tests in the medicine section of CPT®: reviewed  Discussion of test results with the performing providers: no  Decide to obtain previous medical records or to obtain history from someone other than the patient: no  Obtain history from someone other than the patient: no  Review and summarize past medical records: yes  Discuss the patient with other providers: no  Independent visualization of images, tracings, or specimens: no    Risk of  Complications, Morbidity, and/or Mortality  Presenting problems: high  Diagnostic procedures: low  Management options: high    Critical Care  Total time providing critical care: 30-74 minutes    Patient Progress  Patient progress: stable       Class 2 Severe Obesity (BMI >=35 and <=39.9). Obesity-related health conditions include the following: diabetes mellitus. Obesity is unchanged. BMI is is above average; BMI management plan is completed. We discussed low calorie, low carb based diet program, portion control and increasing exercise.       WALESKA Mcgovern, FNP-BC

## 2022-11-13 DIAGNOSIS — E11.9 TYPE 2 DIABETES MELLITUS WITHOUT COMPLICATION, WITHOUT LONG-TERM CURRENT USE OF INSULIN: ICD-10-CM

## 2022-12-08 ENCOUNTER — CLINICAL SUPPORT (OUTPATIENT)
Dept: FAMILY MEDICINE CLINIC | Facility: CLINIC | Age: 42
End: 2022-12-08

## 2022-12-08 DIAGNOSIS — Z23 ENCOUNTER FOR IMMUNIZATION: Primary | ICD-10-CM

## 2022-12-08 DIAGNOSIS — E11.9 TYPE 2 DIABETES MELLITUS WITHOUT COMPLICATION, WITHOUT LONG-TERM CURRENT USE OF INSULIN: ICD-10-CM

## 2022-12-08 PROCEDURE — 90471 IMMUNIZATION ADMIN: CPT | Performed by: REGISTERED NURSE

## 2022-12-08 PROCEDURE — 90686 IIV4 VACC NO PRSV 0.5 ML IM: CPT | Performed by: REGISTERED NURSE

## 2023-01-06 ENCOUNTER — TELEPHONE (OUTPATIENT)
Dept: FAMILY MEDICINE CLINIC | Facility: CLINIC | Age: 43
End: 2023-01-06
Payer: MEDICAID

## 2023-01-11 ENCOUNTER — OFFICE VISIT (OUTPATIENT)
Dept: FAMILY MEDICINE CLINIC | Facility: CLINIC | Age: 43
End: 2023-01-11
Payer: MEDICAID

## 2023-01-11 VITALS — HEIGHT: 63 IN | BODY MASS INDEX: 36.77 KG/M2

## 2023-05-30 ENCOUNTER — TELEPHONE (OUTPATIENT)
Dept: FAMILY MEDICINE CLINIC | Facility: CLINIC | Age: 43
End: 2023-05-30

## 2023-06-05 ENCOUNTER — OFFICE VISIT (OUTPATIENT)
Dept: FAMILY MEDICINE CLINIC | Facility: CLINIC | Age: 43
End: 2023-06-05
Payer: MEDICAID

## 2023-06-05 VITALS
WEIGHT: 200 LBS | BODY MASS INDEX: 35.44 KG/M2 | OXYGEN SATURATION: 98 % | HEIGHT: 63 IN | SYSTOLIC BLOOD PRESSURE: 137 MMHG | TEMPERATURE: 97.9 F | DIASTOLIC BLOOD PRESSURE: 81 MMHG | HEART RATE: 107 BPM

## 2023-06-05 DIAGNOSIS — H66.91 RIGHT OTITIS MEDIA, UNSPECIFIED OTITIS MEDIA TYPE: Primary | ICD-10-CM

## 2023-06-05 DIAGNOSIS — E11.9 TYPE 2 DIABETES MELLITUS WITHOUT COMPLICATION, WITHOUT LONG-TERM CURRENT USE OF INSULIN: ICD-10-CM

## 2023-06-05 DIAGNOSIS — Z13.0 SCREENING FOR ENDOCRINE, METABOLIC AND IMMUNITY DISORDER: ICD-10-CM

## 2023-06-05 DIAGNOSIS — Z13.220 SCREENING FOR LIPID DISORDERS: ICD-10-CM

## 2023-06-05 DIAGNOSIS — Z13.228 SCREENING FOR ENDOCRINE, METABOLIC AND IMMUNITY DISORDER: ICD-10-CM

## 2023-06-05 DIAGNOSIS — Z13.1 SCREENING FOR DIABETES MELLITUS: ICD-10-CM

## 2023-06-05 DIAGNOSIS — Z13.29 SCREENING FOR THYROID DISORDER: ICD-10-CM

## 2023-06-05 DIAGNOSIS — Z13.29 SCREENING FOR ENDOCRINE, METABOLIC AND IMMUNITY DISORDER: ICD-10-CM

## 2023-06-05 PROCEDURE — 80050 GENERAL HEALTH PANEL: CPT | Performed by: REGISTERED NURSE

## 2023-06-05 PROCEDURE — 83036 HEMOGLOBIN GLYCOSYLATED A1C: CPT | Performed by: REGISTERED NURSE

## 2023-06-05 PROCEDURE — 80061 LIPID PANEL: CPT | Performed by: REGISTERED NURSE

## 2023-06-05 RX ORDER — AMOXICILLIN AND CLAVULANATE POTASSIUM 875; 125 MG/1; MG/1
1 TABLET, FILM COATED ORAL 2 TIMES DAILY
Qty: 20 TABLET | Refills: 0 | Status: SHIPPED | OUTPATIENT
Start: 2023-06-05 | End: 2023-06-15

## 2023-06-05 RX ORDER — DULAGLUTIDE 1.5 MG/.5ML
1.5 INJECTION, SOLUTION SUBCUTANEOUS WEEKLY
Qty: 2 ML | Refills: 3 | Status: SHIPPED | OUTPATIENT
Start: 2023-06-05

## 2023-06-05 NOTE — PATIENT INSTRUCTIONS
Trulicity dosing:    Initial: 0.75 mg once weekly; may increase to 1.5 mg once weekly after 4 to 8 weeks if needed to achieve glycemic goals (Ref). If additional glycemic control is needed, may further increase to 3 mg once weekly after at least 4 weeks on the 1.5 mg weekly dose and then to a maximum of 4.5 mg once weekly after at least 4 weeks on the 3 mg weekly dose.

## 2023-06-05 NOTE — PROGRESS NOTES
Chief Complaint  Ear Fullness (Bilateral. X3 months. Both ears have a white film however, left ear is more red / irritated ), Cough (Uses inhaler BID), and Ear Drainage (X3 months. )    Subjective    History of Present Illness {CC  Problem List  Visit  Diagnosis   Encounters  Notes  Medications  Labs  Result Review Imaging  Media :23}     Greer Moses presents to [facility] for Ear Fullness (Bilateral. X3 months. Both ears have a white film however, left ear is more red / irritated ), Cough (Uses inhaler BID), and Ear Drainage (X3 months. ).    History of Present Illness  Patient is a 43-year-old female who presents to the clinic today for 3-month follow-up of type 2 diabetes and with concerns of bilateral ear fullness x3 months.  Patient denies any chest pain, shortness of breath, or any fevers.  Patient denies any known exposure to COVID, flu, or any other contagious illnesses.    In regards to type 2 diabetes, patient's last hemoglobin A1c was 11.1.  Today's most recent hemoglobin A1c was 11.9.  Patient has tried Xigduo, Rybelsus, Jardiance, metformin, and Farxiga in the past to manage her diabetes.  Most recently we started on Trulicity and I discussed increasing this today with patient.  Patient is agreeable to increase to the next dose.  Would recommend an increase again in 1 month if patient is tolerating well.  Patient is still currently taking Farxiga and is tolerating okay at this time.  I would recommend patient return in 1 month with fasting glucose logs to better address this and not wait 3 months.    In regards to bilateral ear fullness, patient shares that she has had worsening bilateral ear fullness now for about 3 months.  She shares that this is not gone away and is requesting antibiotics today.  Upon visualization of bilateral ears patient does appear to have an ear infection.  I will treat her with antibiotics today.  If this does not resolve I would  "recommend patient see ear nose and throat.     Review of Systems   Constitutional: Negative.  Negative for activity change, chills, fatigue and fever.   HENT:  Positive for ear pain. Negative for congestion, dental problem, hearing loss, rhinorrhea, sinus pain, sore throat, tinnitus and trouble swallowing.    Eyes: Negative.  Negative for pain and visual disturbance.   Respiratory: Negative.  Negative for cough, chest tightness, shortness of breath and wheezing.    Cardiovascular: Negative.  Negative for chest pain, palpitations and leg swelling.   Gastrointestinal: Negative.  Negative for abdominal pain, diarrhea, nausea and vomiting.   Endocrine: Negative.  Negative for polydipsia, polyphagia and polyuria.   Genitourinary: Negative.  Negative for difficulty urinating, dysuria, frequency and urgency.   Musculoskeletal: Negative.  Negative for arthralgias, back pain and myalgias.   Skin: Negative.  Negative for color change, pallor, rash and wound.   Allergic/Immunologic: Negative.  Negative for environmental allergies.   Neurological: Negative.  Negative for dizziness, speech difficulty, weakness, light-headedness, numbness and headaches.   Hematological: Negative.    Psychiatric/Behavioral: Negative.  Negative for confusion, decreased concentration, self-injury and suicidal ideas. The patient is not nervous/anxious.    All other systems reviewed and are negative.     Objective     Vital Signs:   /81 (BP Location: Left arm, Patient Position: Sitting, Cuff Size: Large Adult)   Pulse 107   Temp 97.9 °F (36.6 °C) (Oral)   Ht 160 cm (63\")   Wt 90.7 kg (200 lb)   SpO2 98%   BMI 35.43 kg/m²   Current Outpatient Medications on File Prior to Visit   Medication Sig Dispense Refill    Accu-Chek Softclix Lancets lancets See Admin Instructions.      Blood Glucose Monitoring Suppl (Accu-Chek Guide Me) w/Device kit 1 Device Daily. 1 kit 0    Blood Glucose Monitoring Suppl ([employer] Blood Glucose Meter) w/Device kit 1 " Device Daily. 1 kit 0    Budeson-Glycopyrrol-Formoterol (Breztri Aerosphere) 160-9-4.8 MCG/ACT aerosol inhaler Inhale 2 puffs 2 (Two) Times a Day. 5.9 g 3    buprenorphine-naloxone (SUBOXONE) 8-2 MG per SL tablet DISSOLVE 2 AND 1/2 TABLETS UNDER THE TONGUE DAILY      dapagliflozin Propanediol 10 MG tablet Take 10 mg by mouth Daily. 30 tablet 5    glucose blood test strip 1 each by Other route 4 (Four) Times a Day Before Meals & at Bedtime As Needed (glucose management). Use as instructed 400 each 3    losartan (Cozaar) 25 MG tablet Take 1 tablet by mouth Daily. 30 tablet 1    Narcan 4 MG/0.1ML nasal spray        No current facility-administered medications on file prior to visit.        Past Medical History:   Diagnosis Date    Diabetes mellitus     Substance abuse       Past Surgical History:   Procedure Laterality Date    HYSTERECTOMY        Family History   Problem Relation Age of Onset    Diabetes Father     Diabetes Paternal Aunt     Diabetes Paternal Uncle     Cancer Paternal Grandmother       Social History     Socioeconomic History    Marital status:    Tobacco Use    Smoking status: Every Day     Packs/day: 1.00     Years: 29.00     Pack years: 29.00     Types: Cigarettes     Start date: 1992    Smokeless tobacco: Never   Vaping Use    Vaping Use: Never used   Substance and Sexual Activity    Alcohol use: Not Currently    Drug use: Yes     Frequency: 7.0 times per week     Comment: suboxone    Sexual activity: Defer         Office Visit on 06/05/2023   Component Date Value Ref Range Status    Hemoglobin A1C 06/05/2023 11.90 (H)  4.80 - 5.60 % Final    Glucose 06/05/2023 360 (H)  65 - 99 mg/dL Final    BUN 06/05/2023 7  6 - 20 mg/dL Final    Creatinine 06/05/2023 0.75  0.57 - 1.00 mg/dL Final    Sodium 06/05/2023 133 (L)  136 - 145 mmol/L Final    Potassium 06/05/2023 4.1  3.5 - 5.2 mmol/L Final    Chloride 06/05/2023 98  98 - 107 mmol/L Final    CO2 06/05/2023 22.7  22.0 - 29.0 mmol/L Final     Calcium 06/05/2023 9.7  8.6 - 10.5 mg/dL Final    Total Protein 06/05/2023 7.5  6.0 - 8.5 g/dL Final    Albumin 06/05/2023 4.3  3.5 - 5.2 g/dL Final    ALT (SGPT) 06/05/2023 24  1 - 33 U/L Final    AST (SGOT) 06/05/2023 17  1 - 32 U/L Final    Alkaline Phosphatase 06/05/2023 123 (H)  39 - 117 U/L Final    Total Bilirubin 06/05/2023 0.5  0.0 - 1.2 mg/dL Final    Globulin 06/05/2023 3.2  gm/dL Final    A/G Ratio 06/05/2023 1.3  g/dL Final    BUN/Creatinine Ratio 06/05/2023 9.3  7.0 - 25.0 Final    Anion Gap 06/05/2023 12.3  5.0 - 15.0 mmol/L Final    eGFR 06/05/2023 100.2  >60.0 mL/min/1.73 Final    Total Cholesterol 06/05/2023 159  0 - 200 mg/dL Final    Triglycerides 06/05/2023 170 (H)  0 - 150 mg/dL Final    HDL Cholesterol 06/05/2023 30 (L)  40 - 60 mg/dL Final    LDL Cholesterol  06/05/2023 99  0 - 100 mg/dL Final    VLDL Cholesterol 06/05/2023 30  5 - 40 mg/dL Final    LDL/HDL Ratio 06/05/2023 3.17   Final    TSH 06/05/2023 1.150  0.270 - 4.200 uIU/mL Final    WBC 06/05/2023 9.51  3.40 - 10.80 10*3/mm3 Final    RBC 06/05/2023 5.39 (H)  3.77 - 5.28 10*6/mm3 Final    Hemoglobin 06/05/2023 16.1 (H)  12.0 - 15.9 g/dL Final    Hematocrit 06/05/2023 48.6 (H)  34.0 - 46.6 % Final    MCV 06/05/2023 90.2  79.0 - 97.0 fL Final    MCH 06/05/2023 29.9  26.6 - 33.0 pg Final    MCHC 06/05/2023 33.1  31.5 - 35.7 g/dL Final    RDW 06/05/2023 12.5  12.3 - 15.4 % Final    RDW-SD 06/05/2023 40.7  37.0 - 54.0 fl Final    MPV 06/05/2023 12.2 (H)  6.0 - 12.0 fL Final    Platelets 06/05/2023 217  140 - 450 10*3/mm3 Final    Neutrophil % 06/05/2023 65.8  42.7 - 76.0 % Final    Lymphocyte % 06/05/2023 26.9  19.6 - 45.3 % Final    Monocyte % 06/05/2023 4.6 (L)  5.0 - 12.0 % Final    Eosinophil % 06/05/2023 2.0  0.3 - 6.2 % Final    Basophil % 06/05/2023 0.3  0.0 - 1.5 % Final    Immature Grans % 06/05/2023 0.4  0.0 - 0.5 % Final    Neutrophils, Absolute 06/05/2023 6.25  1.70 - 7.00 10*3/mm3 Final    Lymphocytes, Absolute 06/05/2023 2.56   0.70 - 3.10 10*3/mm3 Final    Monocytes, Absolute 06/05/2023 0.44  0.10 - 0.90 10*3/mm3 Final    Eosinophils, Absolute 06/05/2023 0.19  0.00 - 0.40 10*3/mm3 Final    Basophils, Absolute 06/05/2023 0.03  0.00 - 0.20 10*3/mm3 Final    Immature Grans, Absolute 06/05/2023 0.04  0.00 - 0.05 10*3/mm3 Final    nRBC 06/05/2023 0.0  0.0 - 0.2 /100 WBC Final         Physical Exam  Vitals and nursing note reviewed.   Constitutional:       Appearance: Normal appearance. She is normal weight.   HENT:      Head: Normocephalic and atraumatic.      Right Ear: Decreased hearing noted. Tenderness present. Tympanic membrane is erythematous and bulging.      Left Ear: Decreased hearing noted. Tenderness present. Tympanic membrane is erythematous and bulging.   Cardiovascular:      Rate and Rhythm: Normal rate and regular rhythm.      Pulses: Normal pulses.      Heart sounds: Normal heart sounds. No murmur heard.    No friction rub. No gallop.   Pulmonary:      Effort: Pulmonary effort is normal. No respiratory distress.      Breath sounds: Normal breath sounds. No stridor. No wheezing, rhonchi or rales.   Chest:      Chest wall: No tenderness.   Abdominal:      General: Abdomen is flat. Bowel sounds are normal. There is no distension.      Palpations: Abdomen is soft. There is no mass.      Tenderness: There is no abdominal tenderness. There is no right CVA tenderness, left CVA tenderness, guarding or rebound.      Hernia: No hernia is present.   Skin:     General: Skin is warm and dry.      Capillary Refill: Capillary refill takes less than 2 seconds.      Coloration: Skin is not jaundiced or pale.   Neurological:      General: No focal deficit present.      Mental Status: She is alert and oriented to person, place, and time. Mental status is at baseline.      Motor: No weakness.      Coordination: Coordination normal.      Gait: Gait normal.   Psychiatric:         Mood and Affect: Mood normal.         Behavior: Behavior normal.          Thought Content: Thought content normal.         Judgment: Judgment normal.        Result Review  Data Reviewed:{ Labs  Result Review  Imaging  Med Tab  Media :23}   I have reviewed this patient's chart.  I have reviewed previous labs, previous imaging, previous medications, and previous encounters with notes that were available in this patient's chart.             Assessment and Plan {CC Problem List  Visit Diagnosis  ROS  Review (Popup)  [facility]  BestPractice  Medications  SmartSets  SnapShot Encounters  Media :23}   Diagnoses and all orders for this visit:    1. Right otitis media, unspecified otitis media type (Primary)  -     amoxicillin-clavulanate (AUGMENTIN) 875-125 MG per tablet; Take 1 tablet by mouth 2 (Two) Times a Day for 10 days.  Dispense: 20 tablet; Refill: 0    2. Type 2 diabetes mellitus without complication, without long-term current use of insulin  -     Dulaglutide (Trulicity) 1.5 MG/0.5ML solution pen-injector; Inject 1.5 mg under the skin into the appropriate area as directed 1 (One) Time Per Week.  Dispense: 2 mL; Refill: 3  -     Hemoglobin A1c  -     Comprehensive Metabolic Panel  -     CBC & Differential    3. Screening for diabetes mellitus  -     Hemoglobin A1c    4. Screening for endocrine, metabolic and immunity disorder  -     Comprehensive Metabolic Panel  -     CBC & Differential    5. Screening for lipid disorders  -     Lipid Panel    6. Screening for thyroid disorder  -     TSH      -Fasting labs today.  -Increase Trulicity to 1.5 mg weekly.  -ER red flags discussed with patient.  -Follow-up in 4 weeks to discuss next dose increase of Trulicity.  -Please bring fasting glucose logs with you to next visit.    I spent 40 minutes caring for Greer on this date of service. This time includes time spent by me in the following activities:preparing for the visit, reviewing tests, obtaining and/or reviewing a separately obtained history, performing  a medically appropriate examination and/or evaluation , counseling and educating the patient/family/caregiver, ordering medications, tests, or procedures, referring and communicating with other health care professionals , documenting information in the medical record, independently interpreting results and communicating that information with the patient/family/caregiver, and care coordination  Follow Up {Instructions Charge Capture  Follow-up Communications :23}     Patient was given instructions and counseling regarding her condition or for health maintenance advice. Please see specific information pulled into the AVS (placed there by myself) if appropriate.    Return in about 4 weeks (around 7/3/2023) for Recheck diabetes.    MDM     Amount and/or Complexity of Data Reviewed  Clinical lab tests: ordered and reviewed  Tests in the radiology section of CPT®: reviewed  Tests in the medicine section of CPT®: reviewed  Discussion of test results with the performing providers: no  Decide to obtain previous medical records or to obtain history from someone other than the patient: no  Obtain history from someone other than the patient: no  Review and summarize past medical records: yes  Discuss the patient with other providers: no  Independent visualization of images, tracings, or specimens: no    Risk of Complications, Morbidity, and/or Mortality  Presenting problems: moderate  Diagnostic procedures: low  Management options: moderate    Patient Progress  Patient progress: stable       Class 2 Severe Obesity (BMI >=35 and <=39.9). Obesity-related health conditions include the following: hypertension and diabetes mellitus. Obesity is unchanged. BMI is is above average; BMI management plan is completed. We discussed portion control and increasing exercise.       WALESKA Mcgovern, FNP-BC

## 2023-06-06 LAB
ALBUMIN SERPL-MCNC: 4.3 G/DL (ref 3.5–5.2)
ALBUMIN/GLOB SERPL: 1.3 G/DL
ALP SERPL-CCNC: 123 U/L (ref 39–117)
ALT SERPL W P-5'-P-CCNC: 24 U/L (ref 1–33)
ANION GAP SERPL CALCULATED.3IONS-SCNC: 12.3 MMOL/L (ref 5–15)
AST SERPL-CCNC: 17 U/L (ref 1–32)
BASOPHILS # BLD AUTO: 0.03 10*3/MM3 (ref 0–0.2)
BASOPHILS NFR BLD AUTO: 0.3 % (ref 0–1.5)
BILIRUB SERPL-MCNC: 0.5 MG/DL (ref 0–1.2)
BUN SERPL-MCNC: 7 MG/DL (ref 6–20)
BUN/CREAT SERPL: 9.3 (ref 7–25)
CALCIUM SPEC-SCNC: 9.7 MG/DL (ref 8.6–10.5)
CHLORIDE SERPL-SCNC: 98 MMOL/L (ref 98–107)
CHOLEST SERPL-MCNC: 159 MG/DL (ref 0–200)
CO2 SERPL-SCNC: 22.7 MMOL/L (ref 22–29)
CREAT SERPL-MCNC: 0.75 MG/DL (ref 0.57–1)
DEPRECATED RDW RBC AUTO: 40.7 FL (ref 37–54)
EGFRCR SERPLBLD CKD-EPI 2021: 100.2 ML/MIN/1.73
EOSINOPHIL # BLD AUTO: 0.19 10*3/MM3 (ref 0–0.4)
EOSINOPHIL NFR BLD AUTO: 2 % (ref 0.3–6.2)
ERYTHROCYTE [DISTWIDTH] IN BLOOD BY AUTOMATED COUNT: 12.5 % (ref 12.3–15.4)
GLOBULIN UR ELPH-MCNC: 3.2 GM/DL
GLUCOSE SERPL-MCNC: 360 MG/DL (ref 65–99)
HBA1C MFR BLD: 11.9 % (ref 4.8–5.6)
HCT VFR BLD AUTO: 48.6 % (ref 34–46.6)
HDLC SERPL-MCNC: 30 MG/DL (ref 40–60)
HGB BLD-MCNC: 16.1 G/DL (ref 12–15.9)
IMM GRANULOCYTES # BLD AUTO: 0.04 10*3/MM3 (ref 0–0.05)
IMM GRANULOCYTES NFR BLD AUTO: 0.4 % (ref 0–0.5)
LDLC SERPL CALC-MCNC: 99 MG/DL (ref 0–100)
LDLC/HDLC SERPL: 3.17 {RATIO}
LYMPHOCYTES # BLD AUTO: 2.56 10*3/MM3 (ref 0.7–3.1)
LYMPHOCYTES NFR BLD AUTO: 26.9 % (ref 19.6–45.3)
MCH RBC QN AUTO: 29.9 PG (ref 26.6–33)
MCHC RBC AUTO-ENTMCNC: 33.1 G/DL (ref 31.5–35.7)
MCV RBC AUTO: 90.2 FL (ref 79–97)
MONOCYTES # BLD AUTO: 0.44 10*3/MM3 (ref 0.1–0.9)
MONOCYTES NFR BLD AUTO: 4.6 % (ref 5–12)
NEUTROPHILS NFR BLD AUTO: 6.25 10*3/MM3 (ref 1.7–7)
NEUTROPHILS NFR BLD AUTO: 65.8 % (ref 42.7–76)
NRBC BLD AUTO-RTO: 0 /100 WBC (ref 0–0.2)
PLATELET # BLD AUTO: 217 10*3/MM3 (ref 140–450)
PMV BLD AUTO: 12.2 FL (ref 6–12)
POTASSIUM SERPL-SCNC: 4.1 MMOL/L (ref 3.5–5.2)
PROT SERPL-MCNC: 7.5 G/DL (ref 6–8.5)
RBC # BLD AUTO: 5.39 10*6/MM3 (ref 3.77–5.28)
SODIUM SERPL-SCNC: 133 MMOL/L (ref 136–145)
TRIGL SERPL-MCNC: 170 MG/DL (ref 0–150)
TSH SERPL DL<=0.05 MIU/L-ACNC: 1.15 UIU/ML (ref 0.27–4.2)
VLDLC SERPL-MCNC: 30 MG/DL (ref 5–40)
WBC NRBC COR # BLD: 9.51 10*3/MM3 (ref 3.4–10.8)

## 2023-10-06 ENCOUNTER — TELEPHONE (OUTPATIENT)
Dept: FAMILY MEDICINE CLINIC | Facility: CLINIC | Age: 43
End: 2023-10-06
Payer: MEDICAID

## 2023-10-06 NOTE — TELEPHONE ENCOUNTER
HUB to read description below.    LVM FOR PT TO CALL OFFICE RESCHEDULE APPOINTMENT THAT ON 10-. THANK YOU.

## 2023-11-05 DIAGNOSIS — E11.9 TYPE 2 DIABETES MELLITUS WITHOUT COMPLICATION, WITHOUT LONG-TERM CURRENT USE OF INSULIN: ICD-10-CM

## 2023-11-06 RX ORDER — DULAGLUTIDE 1.5 MG/.5ML
INJECTION, SOLUTION SUBCUTANEOUS
Qty: 2 ML | Refills: 1 | Status: SHIPPED | OUTPATIENT
Start: 2023-11-06

## 2025-06-18 ENCOUNTER — OFFICE VISIT (OUTPATIENT)
Dept: FAMILY MEDICINE CLINIC | Facility: CLINIC | Age: 45
End: 2025-06-18
Payer: MEDICAID

## 2025-06-18 VITALS
WEIGHT: 178.6 LBS | TEMPERATURE: 98.6 F | SYSTOLIC BLOOD PRESSURE: 131 MMHG | HEIGHT: 63 IN | HEART RATE: 90 BPM | BODY MASS INDEX: 31.64 KG/M2 | DIASTOLIC BLOOD PRESSURE: 81 MMHG | OXYGEN SATURATION: 97 % | RESPIRATION RATE: 18 BRPM

## 2025-06-18 DIAGNOSIS — E11.9 TYPE 2 DIABETES MELLITUS WITHOUT COMPLICATION, WITHOUT LONG-TERM CURRENT USE OF INSULIN: Primary | ICD-10-CM

## 2025-06-18 DIAGNOSIS — I21.11 ST ELEVATION MYOCARDIAL INFARCTION INVOLVING RIGHT CORONARY ARTERY: ICD-10-CM

## 2025-06-18 DIAGNOSIS — Z09 HOSPITAL DISCHARGE FOLLOW-UP: ICD-10-CM

## 2025-06-18 PROBLEM — F11.20 OPIATE DEPENDENCE: Status: ACTIVE | Noted: 2025-04-17

## 2025-06-18 PROBLEM — R07.9 CHEST PAIN: Status: ACTIVE | Noted: 2025-04-28

## 2025-06-18 PROBLEM — E11.65 TYPE 2 DIABETES MELLITUS WITH HYPERGLYCEMIA: Status: ACTIVE | Noted: 2025-04-17

## 2025-06-18 RX ORDER — BUPRENORPHINE HYDROCHLORIDE AND NALOXONE HYDROCHLORIDE DIHYDRATE 8; 2 MG/1; MG/1
2.5 TABLET SUBLINGUAL DAILY
COMMUNITY

## 2025-06-18 RX ORDER — INSULIN GLARGINE 100 [IU]/ML
20 INJECTION, SOLUTION SUBCUTANEOUS DAILY
Qty: 9 ML | Refills: 1 | Status: SHIPPED | OUTPATIENT
Start: 2025-06-18

## 2025-06-18 RX ORDER — UBIDECARENONE 100 MG
100 CAPSULE ORAL DAILY
COMMUNITY

## 2025-06-18 RX ORDER — INSULIN GLARGINE 100 [IU]/ML
15 INJECTION, SOLUTION SUBCUTANEOUS DAILY
COMMUNITY
Start: 2025-04-18 | End: 2025-06-18 | Stop reason: SDUPTHER

## 2025-06-18 RX ORDER — PRASUGREL 10 MG/1
10 TABLET, FILM COATED ORAL DAILY
COMMUNITY
Start: 2025-04-18

## 2025-06-18 RX ORDER — NITROGLYCERIN 0.4 MG/1
0.4 TABLET SUBLINGUAL
COMMUNITY
Start: 2025-04-28

## 2025-06-18 RX ORDER — LOSARTAN POTASSIUM 25 MG/1
12.5 TABLET ORAL DAILY
COMMUNITY
Start: 2025-04-18 | End: 2026-04-18

## 2025-06-18 RX ORDER — METOPROLOL SUCCINATE 25 MG/1
25 TABLET, EXTENDED RELEASE ORAL DAILY
COMMUNITY
Start: 2025-04-18 | End: 2026-04-18

## 2025-06-18 RX ORDER — PEN NEEDLE, DIABETIC 30 GX3/16"
1 NEEDLE, DISPOSABLE MISCELLANEOUS DAILY
Qty: 100 EACH | Refills: 1 | Status: SHIPPED | OUTPATIENT
Start: 2025-06-18

## 2025-06-18 RX ORDER — ASPIRIN 81 MG/1
81 TABLET ORAL DAILY
COMMUNITY
Start: 2025-04-18 | End: 2026-04-18

## 2025-06-18 RX ORDER — ROSUVASTATIN CALCIUM 40 MG/1
40 TABLET, COATED ORAL DAILY
COMMUNITY
Start: 2025-04-18 | End: 2026-04-18

## 2025-06-18 RX ORDER — DAPAGLIFLOZIN 10 MG/1
10 TABLET, FILM COATED ORAL DAILY
Qty: 90 TABLET | Refills: 0 | Status: SHIPPED | OUTPATIENT
Start: 2025-06-18

## 2025-06-18 RX ORDER — NICOTINE 21 MG/24HR
1 PATCH, TRANSDERMAL 24 HOURS TRANSDERMAL DAILY
COMMUNITY
Start: 2025-04-18

## 2025-06-18 NOTE — PROGRESS NOTES
Chief Complaint  Annual Exam (Pt had heart attack and is now seeing cardiology.  Her sugars were messed up and they added Lantus and Ozempic.  Pt is having trouble gettting ozempic.  Legs and feet are hurting.)    Subjective    History of Present Illness {CC  Problem List  Visit  Diagnosis   Encounters  Notes  Medications  Labs  Result Review Imaging  Media :23}     Greer Moses presents to [facility] for Annual Exam (Pt had heart attack and is now seeing cardiology.  Her sugars were messed up and they added Lantus and Ozempic.  Pt is having trouble gettting ozempic.  Legs and feet are hurting.).      History of Present Illness  Patient is a 45 y.o. female who presents to the clinic today for a 2-month hospital follow-up and 3-month follow-up for type 2 diabetes.  Patient denies any chest pain, shortness of breath, or any fevers.  Patient denies any known exposure to COVID, flu, or any other contagious illnesses.       History of Present Illness  In regards to hospital follow-up, patient reports that she experienced a STEMI involving the right coronary artery on 04/16/2025, which necessitated hospitalization at [facility]. The onset of the heart attack was characterized by severe central chest pain, followed by numbness and tingling. Her heart rate was notably low at 40. She also reported shoulder pain, which she initially dismissed as insignificant. Currently, she is under the care of a cardiologist and reports feeling better than she has in a long time. She is on metoprolol 25 mg, losartan 12.5 mg, prasugrel, and rosuvastatin.  Patient denies any further concerns with her STEMI or cardiac care at this time.  Patient is strongly encouraged to continue following up with cardiology and to adhere to the recommendations.    In regards to type 2 diabetes, patient has had significant difficulty with affording her diabetic medication recently.  Due to an insurance change,  she was without medication for 18 months. During this period, her A1c level was recorded at 13.3. Currently, her blood glucose levels range between 150 and 250. She has not initiated Ozempic, which was prescribed during her hospital stay. Her current regimen includes Farxiga and Lantus 15 units, administered in the morning. She reports that her blood glucose levels are slightly elevated in the morning but remain stable throughout the day. During her hospital stay, she received Lantus and short-acting insulin before meals. She has no history of pancreatitis. She has previously been on Trulicity, which resulted in a weight loss of approximately 100 pounds. She has not tried Byetta or Saxenda. She has also been on Jardiance and metformin in the past. She experienced severe sulfur burps with Trulicity initially, but this side effect resolved over time.  Patient shares that she is looking forward to starting Ozempic to see if this will help better control her glucose levels and bring her A1c back within an acceptable range.    She is currently using nicotine patches and is making efforts to quit smoking. She is down to about 3 cigarettes a day from almost 2 packs a day. She also uses gum as needed.    SOCIAL HISTORY  The patient is smoking about 3 cigarettes a day currently, down from almost 2 packs a day.       Review of Systems   Constitutional: Negative.  Negative for activity change, chills, fatigue and fever.   HENT: Negative.  Negative for congestion, dental problem, ear pain, hearing loss, rhinorrhea, sinus pain, sore throat, tinnitus and trouble swallowing.    Eyes: Negative.  Negative for pain and visual disturbance.   Respiratory: Negative.  Negative for cough, chest tightness, shortness of breath and wheezing.    Cardiovascular: Negative.  Negative for chest pain, palpitations and leg swelling.   Gastrointestinal: Negative.  Negative for abdominal pain, diarrhea, nausea and vomiting.   Endocrine: Negative.   "Negative for polydipsia, polyphagia and polyuria.   Genitourinary: Negative.  Negative for difficulty urinating, dysuria, frequency and urgency.   Musculoskeletal: Negative.  Negative for arthralgias, back pain and myalgias.   Skin: Negative.  Negative for color change, pallor, rash and wound.   Allergic/Immunologic: Negative.  Negative for environmental allergies.   Neurological: Negative.  Negative for dizziness, speech difficulty, weakness, light-headedness, numbness and headaches.   Hematological: Negative.    Psychiatric/Behavioral: Negative.  Negative for confusion, decreased concentration, self-injury and suicidal ideas. The patient is not nervous/anxious.    All other systems reviewed and are negative.       Objective     Vital Signs:   /81 (BP Location: Left arm, Patient Position: Sitting, Cuff Size: Adult)   Pulse 90   Temp 98.6 °F (37 °C) (Temporal)   Resp 18   Ht 160 cm (63\")   Wt 81 kg (178 lb 9.6 oz)   SpO2 97%   BMI 31.64 kg/m²   Current Outpatient Medications on File Prior to Visit   Medication Sig Dispense Refill    Accu-Chek Softclix Lancets lancets See Admin Instructions.      aspirin 81 MG EC tablet Take 1 tablet by mouth Daily.      Blood Glucose Monitoring Suppl (Accu-Chek Guide Me) w/Device kit 1 Device Daily. 1 kit 0    Blood Glucose Monitoring Suppl ([employer] Blood Glucose Meter) w/Device kit 1 Device Daily. 1 kit 0    Budeson-Glycopyrrol-Formoterol (Breztri Aerosphere) 160-9-4.8 MCG/ACT aerosol inhaler Inhale 2 puffs 2 (Two) Times a Day. 5.9 g 3    coenzyme Q10 100 MG capsule Take 1 capsule by mouth Daily.      glucose blood test strip 1 each by Other route 4 (Four) Times a Day Before Meals & at Bedtime As Needed (glucose management). Use as instructed 400 each 3    losartan (COZAAR) 25 MG tablet Take 0.5 tablets by mouth Daily.      metoprolol succinate XL (TOPROL-XL) 25 MG 24 hr tablet Take 1 tablet by mouth Daily.      nicotine (NICODERM CQ) 21 MG/24HR patch Place 1 patch on the " skin as directed by provider Daily.      nicotine polacrilex (NICORETTE) 2 MG gum Take 1 each by mouth.      nitroglycerin (NITROSTAT) 0.4 MG SL tablet Place 1 tablet under the tongue Every 5 (Five) Minutes As Needed for Chest Pain.      prasugrel (EFFIENT) 10 MG tablet Take 1 tablet by mouth Daily.      rosuvastatin (CRESTOR) 40 MG tablet Take 1 tablet by mouth Daily.      buprenorphine-naloxone (SUBOXONE) 8-2 MG per SL tablet Place 2.5 tablets under the tongue Daily.       No current facility-administered medications on file prior to visit.        Past Medical History:   Diagnosis Date    COPD (chronic obstructive pulmonary disease) 12/2021    Diabetes mellitus     Headache 1998    Heart attack     Obesity 2003    Substance abuse       Past Surgical History:   Procedure Laterality Date    HYSTERECTOMY        Family History   Problem Relation Age of Onset    Diabetes Father     Alcohol abuse Father     Drug abuse Father     Early death Father         Complications from diabetes    Diabetes Paternal Aunt     Diabetes Paternal Uncle     Cancer Paternal Grandmother     Learning disabilities Son     Mental illness Maternal Uncle       Social History     Socioeconomic History    Marital status:    Tobacco Use    Smoking status: Every Day     Current packs/day: 1.00     Average packs/day: 1 pack/day for 33.5 years (33.5 ttl pk-yrs)     Types: Cigarettes     Start date: 1992    Smokeless tobacco: Never   Vaping Use    Vaping status: Never Used   Substance and Sexual Activity    Alcohol use: Not Currently    Drug use: Yes     Frequency: 7.0 times per week     Comment: suboxone    Sexual activity: Defer         No visits with results within 3 Month(s) from this visit.   Latest known visit with results is:   Office Visit on 06/05/2023   Component Date Value Ref Range Status    Hemoglobin A1C 06/05/2023 11.90 (H)  4.80 - 5.60 % Final    Glucose 06/05/2023 360 (H)  65 - 99 mg/dL Final    BUN 06/05/2023 7  6 - 20 mg/dL  Final    Creatinine 06/05/2023 0.75  0.57 - 1.00 mg/dL Final    Sodium 06/05/2023 133 (L)  136 - 145 mmol/L Final    Potassium 06/05/2023 4.1  3.5 - 5.2 mmol/L Final    Chloride 06/05/2023 98  98 - 107 mmol/L Final    CO2 06/05/2023 22.7  22.0 - 29.0 mmol/L Final    Calcium 06/05/2023 9.7  8.6 - 10.5 mg/dL Final    Total Protein 06/05/2023 7.5  6.0 - 8.5 g/dL Final    Albumin 06/05/2023 4.3  3.5 - 5.2 g/dL Final    ALT (SGPT) 06/05/2023 24  1 - 33 U/L Final    AST (SGOT) 06/05/2023 17  1 - 32 U/L Final    Alkaline Phosphatase 06/05/2023 123 (H)  39 - 117 U/L Final    Total Bilirubin 06/05/2023 0.5  0.0 - 1.2 mg/dL Final    Globulin 06/05/2023 3.2  gm/dL Final    A/G Ratio 06/05/2023 1.3  g/dL Final    BUN/Creatinine Ratio 06/05/2023 9.3  7.0 - 25.0 Final    Anion Gap 06/05/2023 12.3  5.0 - 15.0 mmol/L Final    eGFR 06/05/2023 100.2  >60.0 mL/min/1.73 Final    Total Cholesterol 06/05/2023 159  0 - 200 mg/dL Final    Triglycerides 06/05/2023 170 (H)  0 - 150 mg/dL Final    HDL Cholesterol 06/05/2023 30 (L)  40 - 60 mg/dL Final    LDL Cholesterol  06/05/2023 99  0 - 100 mg/dL Final    VLDL Cholesterol 06/05/2023 30  5 - 40 mg/dL Final    LDL/HDL Ratio 06/05/2023 3.17   Final    TSH 06/05/2023 1.150  0.270 - 4.200 uIU/mL Final    WBC 06/05/2023 9.51  3.40 - 10.80 10*3/mm3 Final    RBC 06/05/2023 5.39 (H)  3.77 - 5.28 10*6/mm3 Final    Hemoglobin 06/05/2023 16.1 (H)  12.0 - 15.9 g/dL Final    Hematocrit 06/05/2023 48.6 (H)  34.0 - 46.6 % Final    MCV 06/05/2023 90.2  79.0 - 97.0 fL Final    MCH 06/05/2023 29.9  26.6 - 33.0 pg Final    MCHC 06/05/2023 33.1  31.5 - 35.7 g/dL Final    RDW 06/05/2023 12.5  12.3 - 15.4 % Final    RDW-SD 06/05/2023 40.7  37.0 - 54.0 fl Final    MPV 06/05/2023 12.2 (H)  6.0 - 12.0 fL Final    Platelets 06/05/2023 217  140 - 450 10*3/mm3 Final    Neutrophil % 06/05/2023 65.8  42.7 - 76.0 % Final    Lymphocyte % 06/05/2023 26.9  19.6 - 45.3 % Final    Monocyte % 06/05/2023 4.6 (L)  5.0 - 12.0 %  Final    Eosinophil % 06/05/2023 2.0  0.3 - 6.2 % Final    Basophil % 06/05/2023 0.3  0.0 - 1.5 % Final    Immature Grans % 06/05/2023 0.4  0.0 - 0.5 % Final    Neutrophils, Absolute 06/05/2023 6.25  1.70 - 7.00 10*3/mm3 Final    Lymphocytes, Absolute 06/05/2023 2.56  0.70 - 3.10 10*3/mm3 Final    Monocytes, Absolute 06/05/2023 0.44  0.10 - 0.90 10*3/mm3 Final    Eosinophils, Absolute 06/05/2023 0.19  0.00 - 0.40 10*3/mm3 Final    Basophils, Absolute 06/05/2023 0.03  0.00 - 0.20 10*3/mm3 Final    Immature Grans, Absolute 06/05/2023 0.04  0.00 - 0.05 10*3/mm3 Final    nRBC 06/05/2023 0.0  0.0 - 0.2 /100 WBC Final         Physical Exam  Vitals and nursing note reviewed.   Constitutional:       Appearance: Normal appearance. She is normal weight.   HENT:      Head: Normocephalic and atraumatic.   Cardiovascular:      Rate and Rhythm: Normal rate and regular rhythm.      Pulses: Normal pulses.      Heart sounds: Normal heart sounds. No murmur heard.     No friction rub. No gallop.   Pulmonary:      Effort: Pulmonary effort is normal. No respiratory distress.      Breath sounds: Normal breath sounds. No stridor. No wheezing, rhonchi or rales.   Chest:      Chest wall: No tenderness.   Abdominal:      General: Abdomen is flat. Bowel sounds are normal. There is no distension.      Palpations: Abdomen is soft. There is no mass.      Tenderness: There is no abdominal tenderness. There is no right CVA tenderness, left CVA tenderness, guarding or rebound.      Hernia: No hernia is present.   Skin:     General: Skin is warm and dry.      Capillary Refill: Capillary refill takes less than 2 seconds.      Coloration: Skin is not jaundiced or pale.   Neurological:      General: No focal deficit present.      Mental Status: She is alert and oriented to person, place, and time. Mental status is at baseline.      Motor: No weakness.      Coordination: Coordination normal.      Gait: Gait normal.   Psychiatric:         Mood and  Affect: Mood normal.         Behavior: Behavior normal.         Thought Content: Thought content normal.         Judgment: Judgment normal.          Physical Exam  Respiratory: Clear to auscultation, no wheezing, rales or rhonchi  Cardiovascular: Regular rate and rhythm, no murmurs, rubs, or gallops       Result Review  Data Reviewed:{ Labs  Result Review  Imaging  Med Tab  Media :23}   I have reviewed this patient's chart.  I have reviewed previous labs, previous imaging, previous medications, and previous encounters with notes that were available in this patient's chart.    Results  Labs   - A1c: 13.3              Assessment and Plan {CC Problem List  Visit Diagnosis  ROS  Review (Popup)  [facility]  BestPractice  Medications  SmartSets  SnapShot Encounters  Media :23}   Diagnoses and all orders for this visit:    1. Type 2 diabetes mellitus without complication, without long-term current use of insulin (Primary)  -     Semaglutide,0.25 or 0.5MG/DOS, (OZEMPIC) 2 MG/3ML solution pen-injector; Inject 0.25 mg under the skin into the appropriate area as directed Every 7 (Seven) Days for 28 days, THEN 0.5 mg Every 7 (Seven) Days for 28 days.  Dispense: 3 mL; Refill: 0  -     dapagliflozin Propanediol 10 MG tablet; Take 10 mg by mouth Daily.  Dispense: 90 tablet; Refill: 0  -     Insulin Glargine (Lantus SoloStar) 100 UNIT/ML injection pen; Inject 20 Units under the skin into the appropriate area as directed Daily.  Dispense: 9 mL; Refill: 1  -     Insulin Pen Needle (Pen Needles) 32G X 4 MM misc; Use 1 each Daily.  Dispense: 100 each; Refill: 1    2. Hospital discharge follow-up    3. ST elevation myocardial infarction involving right coronary artery        Assessment & Plan  1. Diabetes Mellitus.  - Her A1c level was recorded at 12.4 in April 2025. Blood glucose levels have improved, now ranging between 150 and 250.  - Increased pain and limited mobility.  - A prescription for Ozempic  has been issued, with instructions to start at 0.25 mg every 7 days for 4 weeks, then increase to 0.5 mg every 7 days for the subsequent 4 weeks. A follow-up visit is scheduled in the middle of the last round to monitor progress.  - A refill for Farxiga has also been provided. She is advised to increase her Lantus dosage to 20 units if her blood glucose levels persist in the 200s. A prescription for pen needles has been sent over, but she is instructed to inform us if she already has some.    2. Nicotine Dependence.  - She is currently using nicotine patches and has reduced her smoking to about 3 cigarettes a day.  - Improved smoking habits.  - She is encouraged to continue using the patches and consider nicotine gum if needed to further reduce smoking.  - No new medications or therapies prescribed.    3. STEMI.  - She had a STEMI involving the right coronary artery on 04/16/2025.  - Continued follow-ups with cardiology.  - She is advised to continue follow-ups with cardiology for blood pressure medication refills and statin refills. She is currently taking metoprolol 25 mg, losartan 12.5 mg, prasugrel, and rosuvastatin.  - No new medications or therapies prescribed.    Follow-up  The patient will follow up in 6 weeks.       -  -ER red flags discussed with patient including risk versus benefit and education provided.  -Follow-up with me in 6 to 12 weeks or sooner if needed.    I spent 40 minutes caring for Greer on this date of service. This time includes time spent by me in the following activities:preparing for the visit, reviewing tests, obtaining and/or reviewing a separately obtained history, performing a medically appropriate examination and/or evaluation , counseling and educating the patient/family/caregiver, ordering medications, tests, or procedures, referring and communicating with other health care professionals , documenting information in the medical record, independently interpreting results and  communicating that information with the patient/family/caregiver, and care coordination    Follow Up {Instructions Charge Capture  Follow-up Communications :23}     Patient was given instructions and counseling regarding her condition or for health maintenance advice. Please see specific information pulled into the AVS (placed there by myself) if appropriate.    Return in about 3 months (around 9/18/2025) for routine follow up.    BMI is >= 30 and <35. (Class 1 Obesity). The following options were offered after discussion;: exercise counseling/recommendations and nutrition counseling/recommendations         WALESKA Mcgovern, [facility]      Patient or patient representative verbalized consent for the use of Ambient Listening during the visit with  WALESKA Mcgovern for chart documentation. 7/15/2025  22:24 EDT

## 2025-06-19 ENCOUNTER — PRIOR AUTHORIZATION (OUTPATIENT)
Dept: FAMILY MEDICINE CLINIC | Facility: CLINIC | Age: 45
End: 2025-06-19
Payer: MEDICAID

## 2025-07-22 ENCOUNTER — OFFICE VISIT (OUTPATIENT)
Dept: FAMILY MEDICINE CLINIC | Facility: CLINIC | Age: 45
End: 2025-07-22
Payer: MEDICAID

## 2025-07-22 VITALS
OXYGEN SATURATION: 99 % | DIASTOLIC BLOOD PRESSURE: 83 MMHG | TEMPERATURE: 98.3 F | BODY MASS INDEX: 31.47 KG/M2 | SYSTOLIC BLOOD PRESSURE: 136 MMHG | WEIGHT: 177.6 LBS | HEART RATE: 91 BPM | RESPIRATION RATE: 19 BRPM | HEIGHT: 63 IN

## 2025-07-22 DIAGNOSIS — E11.65 TYPE 2 DIABETES MELLITUS WITH HYPERGLYCEMIA, WITH LONG-TERM CURRENT USE OF INSULIN: ICD-10-CM

## 2025-07-22 DIAGNOSIS — Z13.29 SCREENING FOR THYROID DISORDER: ICD-10-CM

## 2025-07-22 DIAGNOSIS — Z13.220 SCREENING FOR LIPID DISORDERS: ICD-10-CM

## 2025-07-22 DIAGNOSIS — E11.65 TYPE 2 DIABETES MELLITUS WITH HYPERGLYCEMIA, WITH LONG-TERM CURRENT USE OF INSULIN: Primary | ICD-10-CM

## 2025-07-22 DIAGNOSIS — I21.11 STEMI INVOLVING RIGHT CORONARY ARTERY: ICD-10-CM

## 2025-07-22 DIAGNOSIS — Z13.29 SCREENING FOR ENDOCRINE, METABOLIC AND IMMUNITY DISORDER: ICD-10-CM

## 2025-07-22 DIAGNOSIS — Z13.228 SCREENING FOR ENDOCRINE, METABOLIC AND IMMUNITY DISORDER: ICD-10-CM

## 2025-07-22 DIAGNOSIS — Z79.4 TYPE 2 DIABETES MELLITUS WITH HYPERGLYCEMIA, WITH LONG-TERM CURRENT USE OF INSULIN: Primary | ICD-10-CM

## 2025-07-22 DIAGNOSIS — Z13.0 SCREENING FOR ENDOCRINE, METABOLIC AND IMMUNITY DISORDER: ICD-10-CM

## 2025-07-22 DIAGNOSIS — Z11.59 NEED FOR HEPATITIS C SCREENING TEST: ICD-10-CM

## 2025-07-22 DIAGNOSIS — Z79.4 TYPE 2 DIABETES MELLITUS WITH HYPERGLYCEMIA, WITH LONG-TERM CURRENT USE OF INSULIN: ICD-10-CM

## 2025-07-22 PROCEDURE — 82043 UR ALBUMIN QUANTITATIVE: CPT | Performed by: REGISTERED NURSE

## 2025-07-22 PROCEDURE — 86803 HEPATITIS C AB TEST: CPT | Performed by: REGISTERED NURSE

## 2025-07-22 PROCEDURE — 83036 HEMOGLOBIN GLYCOSYLATED A1C: CPT | Performed by: REGISTERED NURSE

## 2025-07-22 PROCEDURE — 82570 ASSAY OF URINE CREATININE: CPT | Performed by: REGISTERED NURSE

## 2025-07-22 PROCEDURE — 80050 GENERAL HEALTH PANEL: CPT | Performed by: REGISTERED NURSE

## 2025-07-22 PROCEDURE — 80061 LIPID PANEL: CPT | Performed by: REGISTERED NURSE

## 2025-07-22 RX ORDER — BLOOD SUGAR DIAGNOSTIC
1 STRIP MISCELLANEOUS
Qty: 10 EACH | Refills: 12 | Status: SHIPPED | OUTPATIENT
Start: 2025-07-22 | End: 2025-07-22

## 2025-07-22 RX ORDER — BLOOD SUGAR DIAGNOSTIC
1 STRIP MISCELLANEOUS
Qty: 10 EACH | Refills: 12 | Status: SHIPPED | OUTPATIENT
Start: 2025-07-22

## 2025-07-22 NOTE — TELEPHONE ENCOUNTER
Received fax from [facility] requesting  reason product weekly dose and cyanocobalamin justification/ rationale needs listed on script and refaxed.            Rx Refill Note  Requested Prescriptions     Pending Prescriptions Disp Refills    Semaglutide,0.25 or 0.5MG/DOS, (OZEMPIC) 2 MG/3ML solution pen-injector 3 mL 1     Sig: Inject 0.25 mg under the skin into the appropriate area as directed Every 7 (Seven) Days.

## 2025-07-23 ENCOUNTER — PRIOR AUTHORIZATION (OUTPATIENT)
Dept: FAMILY MEDICINE CLINIC | Facility: CLINIC | Age: 45
End: 2025-07-23
Payer: MEDICAID

## 2025-07-23 LAB
ALBUMIN SERPL-MCNC: 4.3 G/DL (ref 3.5–5.2)
ALBUMIN UR-MCNC: <1.2 MG/DL
ALBUMIN/GLOB SERPL: 1.4 G/DL
ALP SERPL-CCNC: 72 U/L (ref 39–117)
ALT SERPL W P-5'-P-CCNC: 15 U/L (ref 1–33)
ANION GAP SERPL CALCULATED.3IONS-SCNC: 11.5 MMOL/L (ref 5–15)
AST SERPL-CCNC: 18 U/L (ref 1–32)
BASOPHILS # BLD AUTO: 0.04 10*3/MM3 (ref 0–0.2)
BASOPHILS NFR BLD AUTO: 0.6 % (ref 0–1.5)
BILIRUB SERPL-MCNC: 0.6 MG/DL (ref 0–1.2)
BUN SERPL-MCNC: 4 MG/DL (ref 6–20)
BUN/CREAT SERPL: 6.3 (ref 7–25)
CALCIUM SPEC-SCNC: 9.3 MG/DL (ref 8.6–10.5)
CHLORIDE SERPL-SCNC: 105 MMOL/L (ref 98–107)
CHOLEST SERPL-MCNC: 85 MG/DL (ref 0–200)
CO2 SERPL-SCNC: 23.5 MMOL/L (ref 22–29)
CREAT SERPL-MCNC: 0.64 MG/DL (ref 0.57–1)
CREAT UR-MCNC: 61 MG/DL
DEPRECATED RDW RBC AUTO: 41.9 FL (ref 37–54)
EGFRCR SERPLBLD CKD-EPI 2021: 109.8 ML/MIN/1.73
EOSINOPHIL # BLD AUTO: 0.18 10*3/MM3 (ref 0–0.4)
EOSINOPHIL NFR BLD AUTO: 2.8 % (ref 0.3–6.2)
ERYTHROCYTE [DISTWIDTH] IN BLOOD BY AUTOMATED COUNT: 12.8 % (ref 12.3–15.4)
GLOBULIN UR ELPH-MCNC: 3.1 GM/DL
GLUCOSE SERPL-MCNC: 143 MG/DL (ref 65–99)
HBA1C MFR BLD: 7.7 % (ref 4.8–5.6)
HCT VFR BLD AUTO: 46.2 % (ref 34–46.6)
HCV AB SER QL: NORMAL
HDLC SERPL-MCNC: 26 MG/DL (ref 40–60)
HGB BLD-MCNC: 15.3 G/DL (ref 12–15.9)
IMM GRANULOCYTES # BLD AUTO: 0.01 10*3/MM3 (ref 0–0.05)
IMM GRANULOCYTES NFR BLD AUTO: 0.2 % (ref 0–0.5)
LDLC SERPL CALC-MCNC: 40 MG/DL (ref 0–100)
LDLC/HDLC SERPL: 1.53 {RATIO}
LYMPHOCYTES # BLD AUTO: 2.13 10*3/MM3 (ref 0.7–3.1)
LYMPHOCYTES NFR BLD AUTO: 32.8 % (ref 19.6–45.3)
MCH RBC QN AUTO: 30.2 PG (ref 26.6–33)
MCHC RBC AUTO-ENTMCNC: 33.1 G/DL (ref 31.5–35.7)
MCV RBC AUTO: 91.3 FL (ref 79–97)
MICROALBUMIN/CREAT UR: NORMAL MG/G{CREAT}
MONOCYTES # BLD AUTO: 0.42 10*3/MM3 (ref 0.1–0.9)
MONOCYTES NFR BLD AUTO: 6.5 % (ref 5–12)
NEUTROPHILS NFR BLD AUTO: 3.72 10*3/MM3 (ref 1.7–7)
NEUTROPHILS NFR BLD AUTO: 57.1 % (ref 42.7–76)
NRBC BLD AUTO-RTO: 0 /100 WBC (ref 0–0.2)
PLATELET # BLD AUTO: 217 10*3/MM3 (ref 140–450)
PMV BLD AUTO: 11.1 FL (ref 6–12)
POTASSIUM SERPL-SCNC: 3.6 MMOL/L (ref 3.5–5.2)
PROT SERPL-MCNC: 7.4 G/DL (ref 6–8.5)
RBC # BLD AUTO: 5.06 10*6/MM3 (ref 3.77–5.28)
SODIUM SERPL-SCNC: 140 MMOL/L (ref 136–145)
TRIGL SERPL-MCNC: 96 MG/DL (ref 0–150)
TSH SERPL DL<=0.05 MIU/L-ACNC: 0.88 UIU/ML (ref 0.27–4.2)
VLDLC SERPL-MCNC: 19 MG/DL (ref 5–40)
WBC NRBC COR # BLD AUTO: 6.5 10*3/MM3 (ref 3.4–10.8)